# Patient Record
Sex: FEMALE | Race: OTHER | NOT HISPANIC OR LATINO | URBAN - METROPOLITAN AREA
[De-identification: names, ages, dates, MRNs, and addresses within clinical notes are randomized per-mention and may not be internally consistent; named-entity substitution may affect disease eponyms.]

---

## 2023-07-24 ENCOUNTER — EMERGENCY (EMERGENCY)
Facility: HOSPITAL | Age: 28
LOS: 1 days | Discharge: HOME | End: 2023-07-24
Attending: STUDENT IN AN ORGANIZED HEALTH CARE EDUCATION/TRAINING PROGRAM | Admitting: STUDENT IN AN ORGANIZED HEALTH CARE EDUCATION/TRAINING PROGRAM
Payer: COMMERCIAL

## 2023-07-24 VITALS
TEMPERATURE: 99 F | SYSTOLIC BLOOD PRESSURE: 152 MMHG | OXYGEN SATURATION: 99 % | WEIGHT: 184.09 LBS | DIASTOLIC BLOOD PRESSURE: 90 MMHG | RESPIRATION RATE: 18 BRPM | HEART RATE: 87 BPM

## 2023-07-24 VITALS
RESPIRATION RATE: 16 BRPM | OXYGEN SATURATION: 99 % | HEART RATE: 89 BPM | DIASTOLIC BLOOD PRESSURE: 90 MMHG | SYSTOLIC BLOOD PRESSURE: 140 MMHG | TEMPERATURE: 98 F

## 2023-07-24 DIAGNOSIS — O20.0 THREATENED ABORTION: ICD-10-CM

## 2023-07-24 DIAGNOSIS — Z3A.01 LESS THAN 8 WEEKS GESTATION OF PREGNANCY: ICD-10-CM

## 2023-07-24 DIAGNOSIS — O20.9 HEMORRHAGE IN EARLY PREGNANCY, UNSPECIFIED: ICD-10-CM

## 2023-07-24 DIAGNOSIS — O99.891 OTHER SPECIFIED DISEASES AND CONDITIONS COMPLICATING PREGNANCY: ICD-10-CM

## 2023-07-24 DIAGNOSIS — R42 DIZZINESS AND GIDDINESS: ICD-10-CM

## 2023-07-24 LAB
ALBUMIN SERPL ELPH-MCNC: 4.2 G/DL — SIGNIFICANT CHANGE UP (ref 3.3–5)
ALP SERPL-CCNC: 55 U/L — SIGNIFICANT CHANGE UP (ref 40–120)
ALT FLD-CCNC: 11 U/L — SIGNIFICANT CHANGE UP (ref 10–45)
ANION GAP SERPL CALC-SCNC: 15 MMOL/L — SIGNIFICANT CHANGE UP (ref 5–17)
APPEARANCE UR: CLEAR — SIGNIFICANT CHANGE UP
AST SERPL-CCNC: 13 U/L — SIGNIFICANT CHANGE UP (ref 10–40)
BACTERIA # UR AUTO: PRESENT /HPF
BASOPHILS # BLD AUTO: 0.02 K/UL — SIGNIFICANT CHANGE UP (ref 0–0.2)
BASOPHILS NFR BLD AUTO: 0.2 % — SIGNIFICANT CHANGE UP (ref 0–2)
BILIRUB SERPL-MCNC: 0.4 MG/DL — SIGNIFICANT CHANGE UP (ref 0.2–1.2)
BILIRUB UR-MCNC: NEGATIVE — SIGNIFICANT CHANGE UP
BLD GP AB SCN SERPL QL: NEGATIVE — SIGNIFICANT CHANGE UP
BUN SERPL-MCNC: 6 MG/DL — LOW (ref 7–23)
CALCIUM SERPL-MCNC: 9.2 MG/DL — SIGNIFICANT CHANGE UP (ref 8.4–10.5)
CHLORIDE SERPL-SCNC: 102 MMOL/L — SIGNIFICANT CHANGE UP (ref 96–108)
CO2 SERPL-SCNC: 20 MMOL/L — LOW (ref 22–31)
COLOR SPEC: YELLOW — SIGNIFICANT CHANGE UP
CREAT SERPL-MCNC: 0.51 MG/DL — SIGNIFICANT CHANGE UP (ref 0.5–1.3)
DIFF PNL FLD: ABNORMAL
EGFR: 132 ML/MIN/1.73M2 — SIGNIFICANT CHANGE UP
EOSINOPHIL # BLD AUTO: 0.04 K/UL — SIGNIFICANT CHANGE UP (ref 0–0.5)
EOSINOPHIL NFR BLD AUTO: 0.5 % — SIGNIFICANT CHANGE UP (ref 0–6)
EPI CELLS # UR: ABNORMAL /HPF (ref 0–5)
GLUCOSE SERPL-MCNC: 81 MG/DL — SIGNIFICANT CHANGE UP (ref 70–99)
GLUCOSE UR QL: NEGATIVE — SIGNIFICANT CHANGE UP
HCG SERPL-ACNC: HIGH MIU/ML
HCT VFR BLD CALC: 39.4 % — SIGNIFICANT CHANGE UP (ref 34.5–45)
HGB BLD-MCNC: 12.9 G/DL — SIGNIFICANT CHANGE UP (ref 11.5–15.5)
IMM GRANULOCYTES NFR BLD AUTO: 0.3 % — SIGNIFICANT CHANGE UP (ref 0–0.9)
KETONES UR-MCNC: NEGATIVE — SIGNIFICANT CHANGE UP
LEUKOCYTE ESTERASE UR-ACNC: NEGATIVE — SIGNIFICANT CHANGE UP
LYMPHOCYTES # BLD AUTO: 2.4 K/UL — SIGNIFICANT CHANGE UP (ref 1–3.3)
LYMPHOCYTES # BLD AUTO: 27.6 % — SIGNIFICANT CHANGE UP (ref 13–44)
MCHC RBC-ENTMCNC: 28.5 PG — SIGNIFICANT CHANGE UP (ref 27–34)
MCHC RBC-ENTMCNC: 32.7 GM/DL — SIGNIFICANT CHANGE UP (ref 32–36)
MCV RBC AUTO: 87 FL — SIGNIFICANT CHANGE UP (ref 80–100)
MONOCYTES # BLD AUTO: 0.54 K/UL — SIGNIFICANT CHANGE UP (ref 0–0.9)
MONOCYTES NFR BLD AUTO: 6.2 % — SIGNIFICANT CHANGE UP (ref 2–14)
NEUTROPHILS # BLD AUTO: 5.68 K/UL — SIGNIFICANT CHANGE UP (ref 1.8–7.4)
NEUTROPHILS NFR BLD AUTO: 65.2 % — SIGNIFICANT CHANGE UP (ref 43–77)
NITRITE UR-MCNC: POSITIVE
NRBC # BLD: 0 /100 WBCS — SIGNIFICANT CHANGE UP (ref 0–0)
PH UR: 7 — SIGNIFICANT CHANGE UP (ref 5–8)
PLATELET # BLD AUTO: 248 K/UL — SIGNIFICANT CHANGE UP (ref 150–400)
POTASSIUM SERPL-MCNC: 3.7 MMOL/L — SIGNIFICANT CHANGE UP (ref 3.5–5.3)
POTASSIUM SERPL-SCNC: 3.7 MMOL/L — SIGNIFICANT CHANGE UP (ref 3.5–5.3)
PROT SERPL-MCNC: 7.9 G/DL — SIGNIFICANT CHANGE UP (ref 6–8.3)
PROT UR-MCNC: NEGATIVE MG/DL — SIGNIFICANT CHANGE UP
RBC # BLD: 4.53 M/UL — SIGNIFICANT CHANGE UP (ref 3.8–5.2)
RBC # FLD: 15.1 % — HIGH (ref 10.3–14.5)
RBC CASTS # UR COMP ASSIST: < 5 /HPF — SIGNIFICANT CHANGE UP
RH IG SCN BLD-IMP: POSITIVE — SIGNIFICANT CHANGE UP
SODIUM SERPL-SCNC: 137 MMOL/L — SIGNIFICANT CHANGE UP (ref 135–145)
SP GR SPEC: 1.01 — SIGNIFICANT CHANGE UP (ref 1–1.03)
UROBILINOGEN FLD QL: 0.2 E.U./DL — SIGNIFICANT CHANGE UP
WBC # BLD: 8.71 K/UL — SIGNIFICANT CHANGE UP (ref 3.8–10.5)
WBC # FLD AUTO: 8.71 K/UL — SIGNIFICANT CHANGE UP (ref 3.8–10.5)
WBC UR QL: < 5 /HPF — SIGNIFICANT CHANGE UP

## 2023-07-24 PROCEDURE — 76817 TRANSVAGINAL US OBSTETRIC: CPT

## 2023-07-24 PROCEDURE — 36415 COLL VENOUS BLD VENIPUNCTURE: CPT

## 2023-07-24 PROCEDURE — 84702 CHORIONIC GONADOTROPIN TEST: CPT

## 2023-07-24 PROCEDURE — 99284 EMERGENCY DEPT VISIT MOD MDM: CPT | Mod: 25

## 2023-07-24 PROCEDURE — 81001 URINALYSIS AUTO W/SCOPE: CPT

## 2023-07-24 PROCEDURE — 76801 OB US < 14 WKS SINGLE FETUS: CPT

## 2023-07-24 PROCEDURE — 85025 COMPLETE CBC W/AUTO DIFF WBC: CPT

## 2023-07-24 PROCEDURE — 86900 BLOOD TYPING SEROLOGIC ABO: CPT

## 2023-07-24 PROCEDURE — 86901 BLOOD TYPING SEROLOGIC RH(D): CPT

## 2023-07-24 PROCEDURE — 80053 COMPREHEN METABOLIC PANEL: CPT

## 2023-07-24 PROCEDURE — 86850 RBC ANTIBODY SCREEN: CPT

## 2023-07-24 PROCEDURE — 76817 TRANSVAGINAL US OBSTETRIC: CPT | Mod: 26

## 2023-07-24 PROCEDURE — 76801 OB US < 14 WKS SINGLE FETUS: CPT | Mod: 26

## 2023-07-24 PROCEDURE — 99284 EMERGENCY DEPT VISIT MOD MDM: CPT

## 2023-07-24 RX ORDER — NITROFURANTOIN MACROCRYSTAL 50 MG
1 CAPSULE ORAL
Qty: 13 | Refills: 0
Start: 2023-07-24 | End: 2023-07-30

## 2023-07-24 RX ORDER — ACETAMINOPHEN 500 MG
975 TABLET ORAL ONCE
Refills: 0 | Status: COMPLETED | OUTPATIENT
Start: 2023-07-24 | End: 2023-07-24

## 2023-07-24 RX ORDER — NITROFURANTOIN MACROCRYSTAL 50 MG
100 CAPSULE ORAL ONCE
Refills: 0 | Status: COMPLETED | OUTPATIENT
Start: 2023-07-24 | End: 2023-07-24

## 2023-07-24 RX ADMIN — Medication 100 MILLIGRAM(S): at 14:54

## 2023-07-24 RX ADMIN — Medication 975 MILLIGRAM(S): at 13:13

## 2023-07-24 NOTE — ED PROVIDER NOTE - ATTENDING APP SHARED VISIT CONTRIBUTION OF CARE
closed cervical os with vaginal spotting. IUP on TVUS. Discharged home with strict return precautions and follow up.

## 2023-07-24 NOTE — ED ADULT NURSE NOTE - OBJECTIVE STATEMENT
Pt A&Ox4 and able to speak in complete sentences. pt breathing even and unlabored with equal chest rise and fall. Pt has positive pregnancy test reported. Pt endorsed she was concerned d/t spotting. pt hx of previous terminated pregnancy. pt lmp 5/31. pt denies lightheadedness, dizziness, cp, sob, fevers, chills, malodorous discharge, back pain. pt has occasional cramps reported.

## 2023-07-24 NOTE — ED ADULT NURSE NOTE - NS ED NOTE ABUSE SUSPICION NEGLECT YN
"Chief Complaint   Patient presents with     Post Partum Exam      21--pap due--placental abruption and deliver--pt is pumping and now weening off--would like birth control pills       Initial /64 (BP Location: Right arm, Patient Position: Sitting, Cuff Size: Adult Regular)   Ht 1.651 m (5' 5\")   Wt 70.1 kg (154 lb 9.6 oz)   LMP 10/26/2018 (Exact Date)   BMI 25.73 kg/m   Estimated body mass index is 25.73 kg/m  as calculated from the following:    Height as of this encounter: 1.651 m (5' 5\").    Weight as of this encounter: 70.1 kg (154 lb 9.6 oz).  BP completed using cuff size: regular    Questioned patient about current smoking habits.  Pt. has never smoked.          The following HM Due: Pap Smear      "
No

## 2023-07-24 NOTE — ED ADULT TRIAGE NOTE - CHIEF COMPLAINT QUOTE
pt states she is 7 weeks pregnant and is c/o vaginal spotting and cramping since yesterday. LMP: 5/31

## 2023-07-24 NOTE — ED PROVIDER NOTE - CLINICAL SUMMARY MEDICAL DECISION MAKING FREE TEXT BOX
27 yo  @ 7w5d c/o vaginal bleeding yesterday afternoon. Describes spotting, mostly with wiping. Pts first OB visit is scheduled for . Associated with pelvic cramping worse on the left side. Reports some associated lightheadedness this morning. Denies fever, chills, n/v/d, dysuria. Abd soft, nt, nd. Scant blood in vault, no active bleeding, no cmt or adnexal tenderness. 27 yo  @ 7w5d c/o vaginal bleeding yesterday afternoon. Describes spotting, mostly with wiping. Pts first OB visit is scheduled for . Associated with pelvic cramping worse on the left side. Reports some associated lightheadedness this morning. Denies fever, chills, n/v/d, dysuria. Abd soft, nt, nd. Scant blood in vault, no active bleeding, no cmt or adnexal tenderness. r/o ectopic

## 2023-07-24 NOTE — ED PROVIDER NOTE - OBJECTIVE STATEMENT
27 yo  @ 7w5d c/o vaginal bleeding yesterday afternoon. Describes spotting, mostly with wiping. Pts first OB visit is scheduled for . Associated with pelvic cramping worse on the left side. Reports some associated lightheadedness this morning. Denies fever, chills, n/v/d, dysuria.

## 2023-07-24 NOTE — ED PROVIDER NOTE - NSFOLLOWUPINSTRUCTIONS_ED_ALL_ED_FT
Threatened Miscarriage    A threatened miscarriage is the term for vaginal bleeding during your first 20 weeks of pregnancy but the pregnancy has not ended. If you have vaginal bleeding during this time, your health care provider will do tests to check if you are still pregnant. If the tests show you are still pregnant and the developing baby (fetus) inside your womb (uterus) is still growing, your condition is considered a threatened miscarriage. A threatened miscarriage does not mean your pregnancy will end, but it does increase the risk of losing your pregnancy. It is important to have close follow up with your obstetrician.    SEEK IMMEDIATE MEDICAL CARE IF YOU HAVE ANY OF THE FOLLOWING SYMPTOMS: heavy vaginal bleeding, severe low back or abdominal cramps, fever/chills, or lightheadedness/dizziness.    Pelvic Rest    WHAT YOU NEED TO KNOW:    Pelvic rest is when you do not put anything in your vagina. This includes tampons, douching, or sexual activity. You may need to avoid lifting heavy objects. Your healthcare provider will tell you how long you need pelvic rest.    DISCHARGE INSTRUCTIONS:    Return to the emergency department if:     You have sudden, heavy bleeding from your vagina.      You have severe pain.    Contact your healthcare provider if:     You have discharge from your vagina that smells bad.       You have a fever.      You have questions or concerns about your condition or care.    Follow up with your healthcare provider as directed: Write down your questions so you remember to ask them during your visits.    Subchorionic Hematoma    A hematoma is a collection of blood outside of the blood vessels. A subchorionic hematoma is a collection of blood between the outer wall of the embryo (chorion) and the inner wall of the uterus.    This condition can cause vaginal bleeding. Early small hematomas usually shrink on their own and do not affect your baby or pregnancy. When bleeding starts later in pregnancy, or if the hematoma is larger or occurs in older pregnant women, the condition may be more serious. Larger hematomas increase the chances of miscarriage. This condition also increases the risk of:  Premature separation of the placenta from the uterus.  Premature () labor.  Stillbirth.  What are the causes?  The exact cause of this condition is not known. It occurs when blood is trapped between the placenta and the uterine wall because the placenta has  from the original site of implantation.    What increases the risk?  You are more likely to develop this condition if:  You were treated with fertility medicines.  You became pregnant through in vitro fertilization (IVF).  What are the signs or symptoms?  Symptoms of this condition include:  Vaginal spotting or bleeding.  Abdominal pain. This is rare.  Sometimes you may have no symptoms and the bleeding may only be seen when ultrasound images are taken (transvaginal ultrasound).    How is this diagnosed?  This condition is diagnosed based on a physical exam. This includes a pelvic exam. You may also have other tests, including:  Blood tests.  Urine tests.  Ultrasound of the abdomen.  How is this treated?  Treatment for this condition can vary. Treatment may include:  Watchful waiting. You will be monitored closely for any changes in bleeding.  Medicines.  Activity restriction. This may be needed until the bleeding stops.  A medicine called Rh immunoglobulin. This is given if you have an Rh-negative blood type. It prevents Rh sensitization.  Follow these instructions at home:  Stay on bed rest if told to do so by your health care provider.  Do not lift anything that is heavier than 10 lb (4.5 kg), or the limit that you are told by your health care provider.  Track and write down the number of pads you use each day and how soaked (saturated) they are.  Do not use tampons.  Keep all follow-up visits. This is important. Your health care provider may ask you to have follow-up blood tests or ultrasound tests or both.  Contact a health care provider if:  You have any vaginal bleeding.  You have a fever.  Get help right away if:  You have severe cramps in your stomach, back, abdomen, or pelvis.  You pass large clots or tissue. Save any tissue for your health care provider to look at.  You faint.  You become light-headed or weak.  Summary  A subchorionic hematoma is a collection of blood between the outer wall of the embryo (chorion) and the inner wall of the uterus.  This condition can cause vaginal bleeding.  Sometimes you may have no symptoms and the bleeding may only be seen when ultrasound images are taken.  Treatment may include watchful waiting, medicines, or activity restriction.  Keep all follow-up visits. Get help right away if you have severe cramps or heavy vaginal bleeding.  This information is not intended to replace advice given to you by your health care provider. Make sure you discuss any questions you have with your health care provider.

## 2023-07-24 NOTE — ED PROVIDER NOTE - PHYSICAL EXAMINATION
CONSTITUTIONAL: Well-appearing;  in no apparent distress.   HEAD: Normocephalic; atraumatic.   EYES: PERRL; EOM intact; conjunctiva and sclera clear  ENT: normal nose; no rhinorrhea; normal pharynx with no erythema or lesions.   NECK: Supple; non-tender; no LAD  CARDIOVASCULAR: Normal S1, S2; No audible murmurs. Regular rate and rhythm.   RESPIRATORY: Breathing easily; breath sounds clear and equal bilaterally; no wheezes, rhonchi, or rales.  GI: Soft; non-distended; non-tender; no palpable organomegaly.   : pelvic- scant clotted blood in vault, no cmt or adnexal tenderness   MSK: FROM at all extremities, normal tone   EXT: No cyanosis or edema; N/V intact  SKIN: Normal for age and race; warm; dry; good turgor; no apparent lesions or rash.   NEURO: A & O x 3; face symmetric; grossly unremarkable.   PSYCHOLOGICAL: The patient’s mood and manner are appropriate.

## 2023-07-24 NOTE — ED PROVIDER NOTE - PATIENT PORTAL LINK FT
You can access the FollowMyHealth Patient Portal offered by [facility] by registering at the following website: [email or web address]. By joining [employer]’s FollowMyHealth portal, you will also be able to view your health information using other applications (apps) compatible with our system.

## 2023-07-24 NOTE — ED ADULT NURSE NOTE - NSFALLUNIVINTERV_ED_ALL_ED
Bed/Stretcher in lowest position, wheels locked, appropriate side rails in place/Call bell, personal items and telephone in reach/Instruct patient to call for assistance before getting out of bed/chair/stretcher/Non-slip footwear applied when patient is off stretcher/[place] to call system/Physically safe environment - no spills, clutter or unnecessary equipment/Purposeful proactive rounding/Room/bathroom lighting operational, light cord in reach

## 2023-08-21 ENCOUNTER — NON-APPOINTMENT (OUTPATIENT)
Age: 28
End: 2023-08-21

## 2023-08-22 ENCOUNTER — ASOB RESULT (OUTPATIENT)
Age: 28
End: 2023-08-22

## 2023-08-22 ENCOUNTER — APPOINTMENT (OUTPATIENT)
Dept: ANTEPARTUM | Facility: CLINIC | Age: 28
End: 2023-08-22
Payer: MEDICAID

## 2023-08-22 PROCEDURE — 76813 OB US NUCHAL MEAS 1 GEST: CPT

## 2023-08-22 PROCEDURE — 93976 VASCULAR STUDY: CPT

## 2023-11-01 ENCOUNTER — APPOINTMENT (OUTPATIENT)
Dept: ANTEPARTUM | Facility: CLINIC | Age: 28
End: 2023-11-01
Payer: MEDICAID

## 2023-11-01 ENCOUNTER — ASOB RESULT (OUTPATIENT)
Age: 28
End: 2023-11-01

## 2023-11-01 PROCEDURE — 76811 OB US DETAILED SNGL FETUS: CPT

## 2023-11-01 PROCEDURE — 76817 TRANSVAGINAL US OBSTETRIC: CPT

## 2023-11-01 PROCEDURE — 76819 FETAL BIOPHYS PROFIL W/O NST: CPT

## 2023-11-01 PROCEDURE — 76820 UMBILICAL ARTERY ECHO: CPT | Mod: 59

## 2023-11-10 ENCOUNTER — ASOB RESULT (OUTPATIENT)
Age: 28
End: 2023-11-10

## 2023-11-10 ENCOUNTER — APPOINTMENT (OUTPATIENT)
Dept: ANTEPARTUM | Facility: CLINIC | Age: 28
End: 2023-11-10
Payer: MEDICAID

## 2023-11-10 PROCEDURE — 76816 OB US FOLLOW-UP PER FETUS: CPT

## 2023-11-23 ENCOUNTER — INPATIENT (INPATIENT)
Facility: HOSPITAL | Age: 28
LOS: 0 days | Discharge: HOME | End: 2023-11-24
Attending: OBSTETRICS & GYNECOLOGY | Admitting: OBSTETRICS & GYNECOLOGY
Payer: MEDICAID

## 2023-11-23 VITALS — WEIGHT: 195.11 LBS | HEIGHT: 60 IN

## 2023-11-23 DIAGNOSIS — O26.899 OTHER SPECIFIED PREGNANCY RELATED CONDITIONS, UNSPECIFIED TRIMESTER: ICD-10-CM

## 2023-11-23 LAB
ALBUMIN SERPL ELPH-MCNC: 3.8 G/DL — SIGNIFICANT CHANGE UP (ref 3.3–5)
ALBUMIN SERPL ELPH-MCNC: 3.8 G/DL — SIGNIFICANT CHANGE UP (ref 3.3–5)
ALP SERPL-CCNC: 67 U/L — SIGNIFICANT CHANGE UP (ref 40–120)
ALP SERPL-CCNC: 67 U/L — SIGNIFICANT CHANGE UP (ref 40–120)
ALT FLD-CCNC: 14 U/L — SIGNIFICANT CHANGE UP (ref 10–45)
ALT FLD-CCNC: 14 U/L — SIGNIFICANT CHANGE UP (ref 10–45)
ANION GAP SERPL CALC-SCNC: 13 MMOL/L — SIGNIFICANT CHANGE UP (ref 5–17)
ANION GAP SERPL CALC-SCNC: 13 MMOL/L — SIGNIFICANT CHANGE UP (ref 5–17)
APTT BLD: 29.4 SEC — SIGNIFICANT CHANGE UP (ref 24.5–35.6)
APTT BLD: 29.4 SEC — SIGNIFICANT CHANGE UP (ref 24.5–35.6)
APTT BLD: 29.6 SEC — SIGNIFICANT CHANGE UP (ref 24.5–35.6)
APTT BLD: 29.6 SEC — SIGNIFICANT CHANGE UP (ref 24.5–35.6)
AST SERPL-CCNC: 16 U/L — SIGNIFICANT CHANGE UP (ref 10–40)
AST SERPL-CCNC: 16 U/L — SIGNIFICANT CHANGE UP (ref 10–40)
BASOPHILS # BLD AUTO: 0.01 K/UL — SIGNIFICANT CHANGE UP (ref 0–0.2)
BASOPHILS # BLD AUTO: 0.01 K/UL — SIGNIFICANT CHANGE UP (ref 0–0.2)
BASOPHILS NFR BLD AUTO: 0.1 % — SIGNIFICANT CHANGE UP (ref 0–2)
BASOPHILS NFR BLD AUTO: 0.1 % — SIGNIFICANT CHANGE UP (ref 0–2)
BILIRUB SERPL-MCNC: 0.3 MG/DL — SIGNIFICANT CHANGE UP (ref 0.2–1.2)
BILIRUB SERPL-MCNC: 0.3 MG/DL — SIGNIFICANT CHANGE UP (ref 0.2–1.2)
BLD GP AB SCN SERPL QL: NEGATIVE — SIGNIFICANT CHANGE UP
BLD GP AB SCN SERPL QL: NEGATIVE — SIGNIFICANT CHANGE UP
BUN SERPL-MCNC: 4 MG/DL — LOW (ref 7–23)
BUN SERPL-MCNC: 4 MG/DL — LOW (ref 7–23)
CALCIUM SERPL-MCNC: 9.9 MG/DL — SIGNIFICANT CHANGE UP (ref 8.4–10.5)
CALCIUM SERPL-MCNC: 9.9 MG/DL — SIGNIFICANT CHANGE UP (ref 8.4–10.5)
CHLORIDE SERPL-SCNC: 105 MMOL/L — SIGNIFICANT CHANGE UP (ref 96–108)
CHLORIDE SERPL-SCNC: 105 MMOL/L — SIGNIFICANT CHANGE UP (ref 96–108)
CO2 SERPL-SCNC: 23 MMOL/L — SIGNIFICANT CHANGE UP (ref 22–31)
CO2 SERPL-SCNC: 23 MMOL/L — SIGNIFICANT CHANGE UP (ref 22–31)
CREAT ?TM UR-MCNC: 70 MG/DL — SIGNIFICANT CHANGE UP
CREAT ?TM UR-MCNC: 70 MG/DL — SIGNIFICANT CHANGE UP
CREAT SERPL-MCNC: 0.43 MG/DL — LOW (ref 0.5–1.3)
CREAT SERPL-MCNC: 0.43 MG/DL — LOW (ref 0.5–1.3)
EGFR: 137 ML/MIN/1.73M2 — SIGNIFICANT CHANGE UP
EGFR: 137 ML/MIN/1.73M2 — SIGNIFICANT CHANGE UP
EOSINOPHIL # BLD AUTO: 0.03 K/UL — SIGNIFICANT CHANGE UP (ref 0–0.5)
EOSINOPHIL # BLD AUTO: 0.03 K/UL — SIGNIFICANT CHANGE UP (ref 0–0.5)
EOSINOPHIL NFR BLD AUTO: 0.4 % — SIGNIFICANT CHANGE UP (ref 0–6)
EOSINOPHIL NFR BLD AUTO: 0.4 % — SIGNIFICANT CHANGE UP (ref 0–6)
FIBRINOGEN PPP-MCNC: 519 MG/DL — HIGH (ref 200–445)
FIBRINOGEN PPP-MCNC: 519 MG/DL — HIGH (ref 200–445)
GLUCOSE SERPL-MCNC: 109 MG/DL — HIGH (ref 70–99)
GLUCOSE SERPL-MCNC: 109 MG/DL — HIGH (ref 70–99)
HCT VFR BLD CALC: 38.5 % — SIGNIFICANT CHANGE UP (ref 34.5–45)
HCT VFR BLD CALC: 38.5 % — SIGNIFICANT CHANGE UP (ref 34.5–45)
HGB BLD-MCNC: 12.6 G/DL — SIGNIFICANT CHANGE UP (ref 11.5–15.5)
HGB BLD-MCNC: 12.6 G/DL — SIGNIFICANT CHANGE UP (ref 11.5–15.5)
IMM GRANULOCYTES NFR BLD AUTO: 0.5 % — SIGNIFICANT CHANGE UP (ref 0–0.9)
IMM GRANULOCYTES NFR BLD AUTO: 0.5 % — SIGNIFICANT CHANGE UP (ref 0–0.9)
INR BLD: 1 — SIGNIFICANT CHANGE UP (ref 0.85–1.18)
INR BLD: 1 — SIGNIFICANT CHANGE UP (ref 0.85–1.18)
KLEIHAUER-BETKE CALCULATION: 0.1 % — SIGNIFICANT CHANGE UP (ref 0–0.3)
KLEIHAUER-BETKE CALCULATION: 0.1 % — SIGNIFICANT CHANGE UP (ref 0–0.3)
LDH SERPL L TO P-CCNC: 290 U/L — HIGH (ref 50–242)
LDH SERPL L TO P-CCNC: 290 U/L — HIGH (ref 50–242)
LYMPHOCYTES # BLD AUTO: 1.45 K/UL — SIGNIFICANT CHANGE UP (ref 1–3.3)
LYMPHOCYTES # BLD AUTO: 1.45 K/UL — SIGNIFICANT CHANGE UP (ref 1–3.3)
LYMPHOCYTES # BLD AUTO: 19.6 % — SIGNIFICANT CHANGE UP (ref 13–44)
LYMPHOCYTES # BLD AUTO: 19.6 % — SIGNIFICANT CHANGE UP (ref 13–44)
MCHC RBC-ENTMCNC: 29.1 PG — SIGNIFICANT CHANGE UP (ref 27–34)
MCHC RBC-ENTMCNC: 29.1 PG — SIGNIFICANT CHANGE UP (ref 27–34)
MCHC RBC-ENTMCNC: 32.7 GM/DL — SIGNIFICANT CHANGE UP (ref 32–36)
MCHC RBC-ENTMCNC: 32.7 GM/DL — SIGNIFICANT CHANGE UP (ref 32–36)
MCV RBC AUTO: 88.9 FL — SIGNIFICANT CHANGE UP (ref 80–100)
MCV RBC AUTO: 88.9 FL — SIGNIFICANT CHANGE UP (ref 80–100)
MONOCYTES # BLD AUTO: 0.31 K/UL — SIGNIFICANT CHANGE UP (ref 0–0.9)
MONOCYTES # BLD AUTO: 0.31 K/UL — SIGNIFICANT CHANGE UP (ref 0–0.9)
MONOCYTES NFR BLD AUTO: 4.2 % — SIGNIFICANT CHANGE UP (ref 2–14)
MONOCYTES NFR BLD AUTO: 4.2 % — SIGNIFICANT CHANGE UP (ref 2–14)
NEUTROPHILS # BLD AUTO: 5.54 K/UL — SIGNIFICANT CHANGE UP (ref 1.8–7.4)
NEUTROPHILS # BLD AUTO: 5.54 K/UL — SIGNIFICANT CHANGE UP (ref 1.8–7.4)
NEUTROPHILS NFR BLD AUTO: 75.2 % — SIGNIFICANT CHANGE UP (ref 43–77)
NEUTROPHILS NFR BLD AUTO: 75.2 % — SIGNIFICANT CHANGE UP (ref 43–77)
NRBC # BLD: 0 /100 WBCS — SIGNIFICANT CHANGE UP (ref 0–0)
NRBC # BLD: 0 /100 WBCS — SIGNIFICANT CHANGE UP (ref 0–0)
PLATELET # BLD AUTO: 198 K/UL — SIGNIFICANT CHANGE UP (ref 150–400)
PLATELET # BLD AUTO: 198 K/UL — SIGNIFICANT CHANGE UP (ref 150–400)
POTASSIUM SERPL-MCNC: 3.7 MMOL/L — SIGNIFICANT CHANGE UP (ref 3.5–5.3)
POTASSIUM SERPL-MCNC: 3.7 MMOL/L — SIGNIFICANT CHANGE UP (ref 3.5–5.3)
POTASSIUM SERPL-SCNC: 3.7 MMOL/L — SIGNIFICANT CHANGE UP (ref 3.5–5.3)
POTASSIUM SERPL-SCNC: 3.7 MMOL/L — SIGNIFICANT CHANGE UP (ref 3.5–5.3)
PROT ?TM UR-MCNC: 10 MG/DL — SIGNIFICANT CHANGE UP (ref 0–12)
PROT ?TM UR-MCNC: 10 MG/DL — SIGNIFICANT CHANGE UP (ref 0–12)
PROT SERPL-MCNC: 7.4 G/DL — SIGNIFICANT CHANGE UP (ref 6–8.3)
PROT SERPL-MCNC: 7.4 G/DL — SIGNIFICANT CHANGE UP (ref 6–8.3)
PROT/CREAT UR-RTO: 0.1 RATIO — SIGNIFICANT CHANGE UP (ref 0–0.2)
PROT/CREAT UR-RTO: 0.1 RATIO — SIGNIFICANT CHANGE UP (ref 0–0.2)
PROTHROM AB SERPL-ACNC: 11.4 SEC — SIGNIFICANT CHANGE UP (ref 9.5–13)
PROTHROM AB SERPL-ACNC: 11.4 SEC — SIGNIFICANT CHANGE UP (ref 9.5–13)
RBC # BLD: 4.33 M/UL — SIGNIFICANT CHANGE UP (ref 3.8–5.2)
RBC # BLD: 4.33 M/UL — SIGNIFICANT CHANGE UP (ref 3.8–5.2)
RBC # FLD: 14.2 % — SIGNIFICANT CHANGE UP (ref 10.3–14.5)
RBC # FLD: 14.2 % — SIGNIFICANT CHANGE UP (ref 10.3–14.5)
RH IG SCN BLD-IMP: POSITIVE — SIGNIFICANT CHANGE UP
RH IG SCN BLD-IMP: POSITIVE — SIGNIFICANT CHANGE UP
SODIUM SERPL-SCNC: 141 MMOL/L — SIGNIFICANT CHANGE UP (ref 135–145)
SODIUM SERPL-SCNC: 141 MMOL/L — SIGNIFICANT CHANGE UP (ref 135–145)
URATE SERPL-MCNC: 5.1 MG/DL — SIGNIFICANT CHANGE UP (ref 2.5–7)
URATE SERPL-MCNC: 5.1 MG/DL — SIGNIFICANT CHANGE UP (ref 2.5–7)
WBC # BLD: 7.38 K/UL — SIGNIFICANT CHANGE UP (ref 3.8–10.5)
WBC # BLD: 7.38 K/UL — SIGNIFICANT CHANGE UP (ref 3.8–10.5)
WBC # FLD AUTO: 7.38 K/UL — SIGNIFICANT CHANGE UP (ref 3.8–10.5)
WBC # FLD AUTO: 7.38 K/UL — SIGNIFICANT CHANGE UP (ref 3.8–10.5)

## 2023-11-23 PROCEDURE — 88291 CYTO/MOLECULAR REPORT: CPT

## 2023-11-23 RX ORDER — FENTANYL/BUPIVACAINE/NS/PF 2MCG/ML-.1
250 PLASTIC BAG, INJECTION (ML) INJECTION
Refills: 0 | Status: DISCONTINUED | OUTPATIENT
Start: 2023-11-23 | End: 2023-11-24

## 2023-11-23 RX ORDER — CITRIC ACID/SODIUM CITRATE 300-500 MG
15 SOLUTION, ORAL ORAL EVERY 6 HOURS
Refills: 0 | Status: DISCONTINUED | OUTPATIENT
Start: 2023-11-23 | End: 2023-11-24

## 2023-11-23 RX ORDER — OXYTOCIN 10 UNIT/ML
333.33 VIAL (ML) INJECTION
Qty: 20 | Refills: 0 | Status: DISCONTINUED | OUTPATIENT
Start: 2023-11-23 | End: 2023-11-24

## 2023-11-23 RX ORDER — INFLUENZA VIRUS VACCINE 15; 15; 15; 15 UG/.5ML; UG/.5ML; UG/.5ML; UG/.5ML
0.5 SUSPENSION INTRAMUSCULAR ONCE
Refills: 0 | Status: DISCONTINUED | OUTPATIENT
Start: 2023-11-23 | End: 2023-11-24

## 2023-11-23 RX ORDER — SODIUM CHLORIDE 9 MG/ML
1000 INJECTION, SOLUTION INTRAVENOUS
Refills: 0 | Status: DISCONTINUED | OUTPATIENT
Start: 2023-11-23 | End: 2023-11-24

## 2023-11-23 RX ORDER — CHLORHEXIDINE GLUCONATE 213 G/1000ML
1 SOLUTION TOPICAL DAILY
Refills: 0 | Status: DISCONTINUED | OUTPATIENT
Start: 2023-11-23 | End: 2023-11-24

## 2023-11-23 RX ORDER — ALBUTEROL 90 UG/1
2 AEROSOL, METERED ORAL EVERY 6 HOURS
Refills: 0 | Status: DISCONTINUED | OUTPATIENT
Start: 2023-11-23 | End: 2023-11-24

## 2023-11-23 RX ADMIN — SODIUM CHLORIDE 125 MILLILITER(S): 9 INJECTION, SOLUTION INTRAVENOUS at 12:25

## 2023-11-23 NOTE — PATIENT PROFILE OB - FUNCTIONAL ASSESSMENT - BASIC MOBILITY SCORE HIDDEN
Writer updated pt though my chart message, will give pt a call if she doesn't see message soon.    24

## 2023-11-23 NOTE — PATIENT PROFILE OB - AS DELIV COMPLICATIONS OB
Dr Fonseca Friend referred patient to see Dr Laurence De León for mediastinal adenopathy, the office called today to let us know they spoke with the patient and it is ok for us to call to sched an appt  I called patient today @ 11:45am and LM on his VM to call us for an appt 
none

## 2023-11-23 NOTE — PRE-ANESTHESIA EVALUATION ADULT - NSANTHOSAYNRD_GEN_A_CORE
No. NIKA screening performed.  STOP BANG Legend: 0-2 = LOW Risk; 3-4 = INTERMEDIATE Risk; 5-8 = HIGH Risk

## 2023-11-24 ENCOUNTER — TRANSCRIPTION ENCOUNTER (OUTPATIENT)
Age: 28
End: 2023-11-24

## 2023-11-24 VITALS
RESPIRATION RATE: 18 BRPM | SYSTOLIC BLOOD PRESSURE: 125 MMHG | DIASTOLIC BLOOD PRESSURE: 63 MMHG | TEMPERATURE: 98 F | OXYGEN SATURATION: 99 % | HEART RATE: 86 BPM

## 2023-11-24 LAB
T PALLIDUM AB TITR SER: NEGATIVE — SIGNIFICANT CHANGE UP
T PALLIDUM AB TITR SER: NEGATIVE — SIGNIFICANT CHANGE UP

## 2023-11-24 PROCEDURE — 82570 ASSAY OF URINE CREATININE: CPT

## 2023-11-24 PROCEDURE — 59050 FETAL MONITOR W/REPORT: CPT

## 2023-11-24 PROCEDURE — 85025 COMPLETE CBC W/AUTO DIFF WBC: CPT

## 2023-11-24 PROCEDURE — 86146 BETA-2 GLYCOPROTEIN ANTIBODY: CPT

## 2023-11-24 PROCEDURE — 83615 LACTATE (LD) (LDH) ENZYME: CPT

## 2023-11-24 PROCEDURE — 86901 BLOOD TYPING SEROLOGIC RH(D): CPT

## 2023-11-24 PROCEDURE — 80053 COMPREHEN METABOLIC PANEL: CPT

## 2023-11-24 PROCEDURE — 85598 HEXAGNAL PHOSPH PLTLT NEUTRL: CPT

## 2023-11-24 PROCEDURE — 36415 COLL VENOUS BLD VENIPUNCTURE: CPT

## 2023-11-24 PROCEDURE — 85613 RUSSELL VIPER VENOM DILUTED: CPT

## 2023-11-24 PROCEDURE — 86780 TREPONEMA PALLIDUM: CPT

## 2023-11-24 PROCEDURE — 84156 ASSAY OF PROTEIN URINE: CPT

## 2023-11-24 PROCEDURE — 86850 RBC ANTIBODY SCREEN: CPT

## 2023-11-24 PROCEDURE — 81229 CYTOG ALYS CHRML ABNR SNPCGH: CPT

## 2023-11-24 PROCEDURE — 85730 THROMBOPLASTIN TIME PARTIAL: CPT

## 2023-11-24 PROCEDURE — 85610 PROTHROMBIN TIME: CPT

## 2023-11-24 PROCEDURE — 85460 HEMOGLOBIN FETAL: CPT

## 2023-11-24 PROCEDURE — 86900 BLOOD TYPING SEROLOGIC ABO: CPT

## 2023-11-24 PROCEDURE — 88307 TISSUE EXAM BY PATHOLOGIST: CPT | Mod: 26

## 2023-11-24 PROCEDURE — 85384 FIBRINOGEN ACTIVITY: CPT

## 2023-11-24 PROCEDURE — 88307 TISSUE EXAM BY PATHOLOGIST: CPT

## 2023-11-24 PROCEDURE — 86147 CARDIOLIPIN ANTIBODY EA IG: CPT

## 2023-11-24 PROCEDURE — 84550 ASSAY OF BLOOD/URIC ACID: CPT

## 2023-11-24 RX ORDER — OXYCODONE HYDROCHLORIDE 5 MG/1
5 TABLET ORAL
Refills: 0 | Status: DISCONTINUED | OUTPATIENT
Start: 2023-11-24 | End: 2023-11-24

## 2023-11-24 RX ORDER — MAGNESIUM HYDROXIDE 400 MG/1
30 TABLET, CHEWABLE ORAL
Refills: 0 | Status: DISCONTINUED | OUTPATIENT
Start: 2023-11-24 | End: 2023-11-24

## 2023-11-24 RX ORDER — TETANUS TOXOID, REDUCED DIPHTHERIA TOXOID AND ACELLULAR PERTUSSIS VACCINE, ADSORBED 5; 2.5; 8; 8; 2.5 [IU]/.5ML; [IU]/.5ML; UG/.5ML; UG/.5ML; UG/.5ML
0.5 SUSPENSION INTRAMUSCULAR ONCE
Refills: 0 | Status: DISCONTINUED | OUTPATIENT
Start: 2023-11-24 | End: 2023-11-24

## 2023-11-24 RX ORDER — SIMETHICONE 80 MG/1
80 TABLET, CHEWABLE ORAL EVERY 4 HOURS
Refills: 0 | Status: DISCONTINUED | OUTPATIENT
Start: 2023-11-24 | End: 2023-11-24

## 2023-11-24 RX ORDER — PRAMOXINE HYDROCHLORIDE 150 MG/15G
1 AEROSOL, FOAM RECTAL EVERY 4 HOURS
Refills: 0 | Status: DISCONTINUED | OUTPATIENT
Start: 2023-11-24 | End: 2023-11-24

## 2023-11-24 RX ORDER — IBUPROFEN 200 MG
1 TABLET ORAL
Qty: 0 | Refills: 0 | DISCHARGE
Start: 2023-11-24

## 2023-11-24 RX ORDER — HYDROCORTISONE 1 %
1 OINTMENT (GRAM) TOPICAL EVERY 6 HOURS
Refills: 0 | Status: DISCONTINUED | OUTPATIENT
Start: 2023-11-24 | End: 2023-11-24

## 2023-11-24 RX ORDER — ACETAMINOPHEN 500 MG
975 TABLET ORAL
Refills: 0 | Status: DISCONTINUED | OUTPATIENT
Start: 2023-11-24 | End: 2023-11-24

## 2023-11-24 RX ORDER — BENZOCAINE 10 %
1 GEL (GRAM) MUCOUS MEMBRANE EVERY 6 HOURS
Refills: 0 | Status: DISCONTINUED | OUTPATIENT
Start: 2023-11-24 | End: 2023-11-24

## 2023-11-24 RX ORDER — OXYTOCIN 10 UNIT/ML
41.67 VIAL (ML) INJECTION
Qty: 20 | Refills: 0 | Status: DISCONTINUED | OUTPATIENT
Start: 2023-11-24 | End: 2023-11-24

## 2023-11-24 RX ORDER — DIBUCAINE 1 %
1 OINTMENT (GRAM) RECTAL EVERY 6 HOURS
Refills: 0 | Status: DISCONTINUED | OUTPATIENT
Start: 2023-11-24 | End: 2023-11-24

## 2023-11-24 RX ORDER — ALBUTEROL 90 UG/1
2 AEROSOL, METERED ORAL
Qty: 0 | Refills: 0 | DISCHARGE

## 2023-11-24 RX ORDER — SODIUM CHLORIDE 9 MG/ML
3 INJECTION INTRAMUSCULAR; INTRAVENOUS; SUBCUTANEOUS EVERY 8 HOURS
Refills: 0 | Status: DISCONTINUED | OUTPATIENT
Start: 2023-11-24 | End: 2023-11-24

## 2023-11-24 RX ORDER — LANOLIN
1 OINTMENT (GRAM) TOPICAL EVERY 6 HOURS
Refills: 0 | Status: DISCONTINUED | OUTPATIENT
Start: 2023-11-24 | End: 2023-11-24

## 2023-11-24 RX ORDER — KETOROLAC TROMETHAMINE 30 MG/ML
30 SYRINGE (ML) INJECTION ONCE
Refills: 0 | Status: DISCONTINUED | OUTPATIENT
Start: 2023-11-24 | End: 2023-11-24

## 2023-11-24 RX ORDER — IBUPROFEN 200 MG
600 TABLET ORAL EVERY 6 HOURS
Refills: 0 | Status: DISCONTINUED | OUTPATIENT
Start: 2023-11-24 | End: 2023-11-24

## 2023-11-24 RX ORDER — INFLUENZA VIRUS VACCINE 15; 15; 15; 15 UG/.5ML; UG/.5ML; UG/.5ML; UG/.5ML
0.5 SUSPENSION INTRAMUSCULAR ONCE
Refills: 0 | Status: DISCONTINUED | OUTPATIENT
Start: 2023-11-24 | End: 2023-11-24

## 2023-11-24 RX ORDER — AER TRAVELER 0.5 G/1
1 SOLUTION RECTAL; TOPICAL EVERY 4 HOURS
Refills: 0 | Status: DISCONTINUED | OUTPATIENT
Start: 2023-11-24 | End: 2023-11-24

## 2023-11-24 RX ORDER — ACETAMINOPHEN 500 MG
3 TABLET ORAL
Qty: 0 | Refills: 0 | DISCHARGE
Start: 2023-11-24

## 2023-11-24 RX ORDER — DIPHENHYDRAMINE HCL 50 MG
25 CAPSULE ORAL EVERY 6 HOURS
Refills: 0 | Status: DISCONTINUED | OUTPATIENT
Start: 2023-11-24 | End: 2023-11-24

## 2023-11-24 RX ADMIN — Medication 30 MILLIGRAM(S): at 13:50

## 2023-11-24 RX ADMIN — Medication 30 MILLIGRAM(S): at 14:30

## 2023-11-24 NOTE — DISCHARGE NOTE OB - HOSPITAL COURSE
29 y/o  presenting at 25+1 for decreased FM, found to have IUFD. Labor was induced with cytotec. She delivered a female infant. Family opting for private burial, autopsy and cytogenetics. Pt was observed postpartum and remained clinically and hemodynamically stable.

## 2023-11-24 NOTE — DISCHARGE NOTE OB - PATIENT PORTAL LINK FT
You can access the FollowMyHealth Patient Portal offered by [facility] by registering at the following website: [email or web address]. By joining [employer]’s FollowMyHealth portal, you will also be able to view your health information using other applications (apps) compatible with our system.

## 2023-11-24 NOTE — DISCHARGE NOTE OB - INFLUENZA VACCINE ADMINISTRATION DATE:
[facility]   Prep survey completed?  Yes          Nichole Landa RN         24-Nov-2023

## 2023-11-24 NOTE — DISCHARGE NOTE OB - CARE PLAN
1 Principal Discharge DX:	Postpartum state  Assessment and plan of treatment:	Follow up in the office with your doctor in two weeks. Please call to confirm your appointment. You can eat a regular diet. Please take 600mg Mortin every 6 hours and/or Tylenol 975mg every six hours as needed for pain. No heavy lifting and nothing in the vagina until cleared by your doctor. Please call your doctor with any questions or concerns.   Principal Discharge DX:	Postpartum state  Assessment and plan of treatment:	Follow up in the office with your doctor in two weeks. Please call to confirm your appointment. You can eat a regular diet. Please take 600mg Motrin every 6 hours and/or Tylenol 975mg every six hours as needed for pain. No heavy lifting and nothing in the vagina until cleared by your doctor. Please call your doctor with any questions or concerns.

## 2023-11-24 NOTE — DISCHARGE NOTE OB - MEDICATION SUMMARY - MEDICATIONS TO TAKE
I will START or STAY ON the medications listed below when I get home from the hospital:    ibuprofen 600 mg oral tablet  -- 1 tab(s) by mouth every 6 hours  -- Indication: For Pain    acetaminophen 325 mg oral tablet  -- 3 tab(s) by mouth every 6 hours  -- Indication: For Pain    Albuterol (Eqv-ProAir HFA) 90 mcg/inh inhalation aerosol  -- 2 puff(s) inhaled 3 times a day as needed for  bronchospasm  -- Indication: For Asthma

## 2023-11-24 NOTE — DISCHARGE NOTE OB - PERSISTENT HEADACHE, BLURRED VISION
80 y/o male admitted for R MCA stroke syndrome s/p thrombectomy TICI 3 reperfusion and ABBY stenting, now s/p hemorrhagic transformation   - Was on DAPT following stenting, discontinued for hemorrhagic transformation  - currently on clopidogrel 75 mg daily  - Follow up imaging has been stable, neuro exam remains stable   - Neurochecks q1hr  - extubated to room air 12/29- reintubated 1/2/2020  - Vascular Neurology following  - SBP Goal < 160  - PT/OT/SLP  - Mechanical SCDs     Statement Selected

## 2023-11-24 NOTE — DISCHARGE NOTE OB - PLAN OF CARE
Follow up in the office with your doctor in two weeks. Please call to confirm your appointment. You can eat a regular diet. Please take 600mg Mortin every 6 hours and/or Tylenol 975mg every six hours as needed for pain. No heavy lifting and nothing in the vagina until cleared by your doctor. Please call your doctor with any questions or concerns. Follow up in the office with your doctor in two weeks. Please call to confirm your appointment. You can eat a regular diet. Please take 600mg Motrin every 6 hours and/or Tylenol 975mg every six hours as needed for pain. No heavy lifting and nothing in the vagina until cleared by your doctor. Please call your doctor with any questions or concerns.

## 2023-11-24 NOTE — DISCHARGE NOTE OB - CARE PROVIDER_API CALL
Massimo Gutierrez  Obstetrics and Gynecology  [address] [place] [id number]  Phone: [phone]  Fax: [phone]  Established Patient  Follow Up Time:

## 2023-11-25 LAB
B2 GLYCOPROT1 AB SER QL: NEGATIVE — SIGNIFICANT CHANGE UP
B2 GLYCOPROT1 AB SER QL: NEGATIVE — SIGNIFICANT CHANGE UP
CARDIOLIPIN AB SER-ACNC: NEGATIVE — SIGNIFICANT CHANGE UP
CARDIOLIPIN AB SER-ACNC: NEGATIVE — SIGNIFICANT CHANGE UP

## 2023-11-26 DIAGNOSIS — Z3A.25 25 WEEKS GESTATION OF PREGNANCY: ICD-10-CM

## 2023-11-26 DIAGNOSIS — O36.8120 DECREASED FETAL MOVEMENTS, SECOND TRIMESTER, NOT APPLICABLE OR UNSPECIFIED: ICD-10-CM

## 2023-11-26 DIAGNOSIS — Z37.1 SINGLE STILLBIRTH: ICD-10-CM

## 2023-11-26 DIAGNOSIS — O36.4XX0 MATERNAL CARE FOR INTRAUTERINE DEATH, NOT APPLICABLE OR UNSPECIFIED: ICD-10-CM

## 2023-11-27 PROBLEM — Z00.00 ENCOUNTER FOR PREVENTIVE HEALTH EXAMINATION: Status: ACTIVE | Noted: 2023-11-27

## 2023-11-29 LAB
CONFIRM APTT STACLOT: NEGATIVE — SIGNIFICANT CHANGE UP
CONFIRM APTT STACLOT: NEGATIVE — SIGNIFICANT CHANGE UP
DRVVT RATIO: 1.02 RATIO — SIGNIFICANT CHANGE UP (ref 0–1.03)
DRVVT RATIO: 1.02 RATIO — SIGNIFICANT CHANGE UP (ref 0–1.03)
DRVVT SCREEN TO CONFIRM RATIO: SIGNIFICANT CHANGE UP
DRVVT SCREEN TO CONFIRM RATIO: SIGNIFICANT CHANGE UP

## 2023-11-30 LAB
SURGICAL PATHOLOGY STUDY: SIGNIFICANT CHANGE UP
SURGICAL PATHOLOGY STUDY: SIGNIFICANT CHANGE UP

## 2023-12-06 LAB
CHROM ANALY OVERALL INTERP SPEC-IMP: SIGNIFICANT CHANGE UP
CHROM ANALY OVERALL INTERP SPEC-IMP: SIGNIFICANT CHANGE UP

## 2023-12-15 LAB
PRENATAL GENOME CHROMO MICROARRAY: ABNORMAL
PRENATAL GENOME CHROMO MICROARRAY: ABNORMAL

## 2023-12-29 ENCOUNTER — APPOINTMENT (OUTPATIENT)
Dept: ANTEPARTUM | Facility: CLINIC | Age: 28
End: 2023-12-29

## 2024-09-27 ENCOUNTER — ASOB RESULT (OUTPATIENT)
Age: 29
End: 2024-09-27

## 2024-09-27 ENCOUNTER — APPOINTMENT (OUTPATIENT)
Dept: ANTEPARTUM | Facility: CLINIC | Age: 29
End: 2024-09-27
Payer: MEDICAID

## 2024-09-27 PROCEDURE — 93976 VASCULAR STUDY: CPT

## 2024-09-27 PROCEDURE — 76813 OB US NUCHAL MEAS 1 GEST: CPT

## 2024-11-27 ENCOUNTER — APPOINTMENT (OUTPATIENT)
Dept: ANTEPARTUM | Facility: CLINIC | Age: 29
End: 2024-11-27

## 2024-11-27 ENCOUNTER — ASOB RESULT (OUTPATIENT)
Age: 29
End: 2024-11-27

## 2024-11-27 PROCEDURE — 76811 OB US DETAILED SNGL FETUS: CPT

## 2024-11-27 PROCEDURE — 76817 TRANSVAGINAL US OBSTETRIC: CPT

## 2024-11-28 ENCOUNTER — TRANSCRIPTION ENCOUNTER (OUTPATIENT)
Age: 29
End: 2024-11-28

## 2024-12-17 ENCOUNTER — APPOINTMENT (OUTPATIENT)
Dept: ANTEPARTUM | Facility: CLINIC | Age: 29
End: 2024-12-17
Payer: MEDICAID

## 2024-12-17 ENCOUNTER — ASOB RESULT (OUTPATIENT)
Age: 29
End: 2024-12-17

## 2024-12-17 PROCEDURE — 76816 OB US FOLLOW-UP PER FETUS: CPT

## 2024-12-17 PROCEDURE — 76817 TRANSVAGINAL US OBSTETRIC: CPT

## 2025-01-24 ENCOUNTER — APPOINTMENT (OUTPATIENT)
Dept: MATERNAL FETAL MEDICINE | Facility: CLINIC | Age: 30
End: 2025-01-24

## 2025-01-28 ENCOUNTER — ASOB RESULT (OUTPATIENT)
Age: 30
End: 2025-01-28

## 2025-01-28 ENCOUNTER — APPOINTMENT (OUTPATIENT)
Dept: ANTEPARTUM | Facility: CLINIC | Age: 30
End: 2025-01-28
Payer: MEDICAID

## 2025-01-28 PROCEDURE — 76818 FETAL BIOPHYS PROFILE W/NST: CPT | Mod: 59

## 2025-01-28 PROCEDURE — 76820 UMBILICAL ARTERY ECHO: CPT | Mod: 59

## 2025-01-28 PROCEDURE — 76816 OB US FOLLOW-UP PER FETUS: CPT

## 2025-02-03 ENCOUNTER — APPOINTMENT (OUTPATIENT)
Dept: MATERNAL FETAL MEDICINE | Facility: CLINIC | Age: 30
End: 2025-02-03
Payer: MEDICAID

## 2025-02-03 PROCEDURE — 99204 OFFICE O/P NEW MOD 45 MIN: CPT | Mod: 95

## 2025-02-11 ENCOUNTER — ASOB RESULT (OUTPATIENT)
Age: 30
End: 2025-02-11

## 2025-02-11 ENCOUNTER — APPOINTMENT (OUTPATIENT)
Dept: ANTEPARTUM | Facility: CLINIC | Age: 30
End: 2025-02-11
Payer: MEDICAID

## 2025-02-11 PROCEDURE — 76818 FETAL BIOPHYS PROFILE W/NST: CPT

## 2025-02-11 PROCEDURE — 76820 UMBILICAL ARTERY ECHO: CPT | Mod: 59

## 2025-02-11 PROCEDURE — 76816 OB US FOLLOW-UP PER FETUS: CPT

## 2025-02-27 ENCOUNTER — ASOB RESULT (OUTPATIENT)
Age: 30
End: 2025-02-27

## 2025-02-27 ENCOUNTER — APPOINTMENT (OUTPATIENT)
Dept: ANTEPARTUM | Facility: CLINIC | Age: 30
End: 2025-02-27
Payer: MEDICAID

## 2025-02-27 PROCEDURE — 76816 OB US FOLLOW-UP PER FETUS: CPT

## 2025-02-27 PROCEDURE — 76820 UMBILICAL ARTERY ECHO: CPT | Mod: 59

## 2025-02-27 PROCEDURE — 76818 FETAL BIOPHYS PROFILE W/NST: CPT

## 2025-03-03 ENCOUNTER — ASOB RESULT (OUTPATIENT)
Age: 30
End: 2025-03-03

## 2025-03-03 ENCOUNTER — APPOINTMENT (OUTPATIENT)
Dept: ANTEPARTUM | Facility: CLINIC | Age: 30
End: 2025-03-03
Payer: MEDICAID

## 2025-03-03 PROCEDURE — 76815 OB US LIMITED FETUS(S): CPT

## 2025-03-03 PROCEDURE — 76820 UMBILICAL ARTERY ECHO: CPT

## 2025-03-03 PROCEDURE — 76818 FETAL BIOPHYS PROFILE W/NST: CPT

## 2025-03-09 ENCOUNTER — OUTPATIENT (OUTPATIENT)
Dept: OUTPATIENT SERVICES | Facility: HOSPITAL | Age: 30
LOS: 1 days | End: 2025-03-09
Payer: COMMERCIAL

## 2025-03-09 VITALS
OXYGEN SATURATION: 99 % | TEMPERATURE: 98 F | RESPIRATION RATE: 18 BRPM | DIASTOLIC BLOOD PRESSURE: 75 MMHG | SYSTOLIC BLOOD PRESSURE: 146 MMHG | HEART RATE: 77 BPM

## 2025-03-09 DIAGNOSIS — O26.899 OTHER SPECIFIED PREGNANCY RELATED CONDITIONS, UNSPECIFIED TRIMESTER: ICD-10-CM

## 2025-03-09 LAB
ALBUMIN SERPL ELPH-MCNC: 3.3 G/DL — SIGNIFICANT CHANGE UP (ref 3.3–5)
ALBUMIN SERPL ELPH-MCNC: 3.5 G/DL — SIGNIFICANT CHANGE UP (ref 3.3–5)
ALP SERPL-CCNC: 103 U/L — SIGNIFICANT CHANGE UP (ref 40–120)
ALP SERPL-CCNC: 99 U/L — SIGNIFICANT CHANGE UP (ref 40–120)
ALT FLD-CCNC: 11 U/L — SIGNIFICANT CHANGE UP (ref 10–45)
ALT FLD-CCNC: SIGNIFICANT CHANGE UP (ref 10–45)
ANION GAP SERPL CALC-SCNC: 11 MMOL/L — SIGNIFICANT CHANGE UP (ref 5–17)
ANION GAP SERPL CALC-SCNC: 12 MMOL/L — SIGNIFICANT CHANGE UP (ref 5–17)
AST SERPL-CCNC: 15 U/L — SIGNIFICANT CHANGE UP (ref 10–40)
AST SERPL-CCNC: SIGNIFICANT CHANGE UP (ref 10–40)
BASOPHILS # BLD AUTO: 0.01 K/UL — SIGNIFICANT CHANGE UP (ref 0–0.2)
BASOPHILS NFR BLD AUTO: 0.1 % — SIGNIFICANT CHANGE UP (ref 0–2)
BILIRUB SERPL-MCNC: 0.3 MG/DL — SIGNIFICANT CHANGE UP (ref 0.2–1.2)
BILIRUB SERPL-MCNC: 0.4 MG/DL — SIGNIFICANT CHANGE UP (ref 0.2–1.2)
BUN SERPL-MCNC: 3 MG/DL — LOW (ref 7–23)
BUN SERPL-MCNC: 3 MG/DL — LOW (ref 7–23)
CALCIUM SERPL-MCNC: 9 MG/DL — SIGNIFICANT CHANGE UP (ref 8.4–10.5)
CALCIUM SERPL-MCNC: 9.1 MG/DL — SIGNIFICANT CHANGE UP (ref 8.4–10.5)
CHLORIDE SERPL-SCNC: 107 MMOL/L — SIGNIFICANT CHANGE UP (ref 96–108)
CHLORIDE SERPL-SCNC: 107 MMOL/L — SIGNIFICANT CHANGE UP (ref 96–108)
CO2 SERPL-SCNC: 19 MMOL/L — LOW (ref 22–31)
CO2 SERPL-SCNC: 19 MMOL/L — LOW (ref 22–31)
CREAT ?TM UR-MCNC: 25 MG/DL — SIGNIFICANT CHANGE UP
CREAT SERPL-MCNC: 0.41 MG/DL — LOW (ref 0.5–1.3)
CREAT SERPL-MCNC: 0.41 MG/DL — LOW (ref 0.5–1.3)
EGFR: 138 ML/MIN/1.73M2 — SIGNIFICANT CHANGE UP
EOSINOPHIL # BLD AUTO: 0.03 K/UL — SIGNIFICANT CHANGE UP (ref 0–0.5)
EOSINOPHIL NFR BLD AUTO: 0.4 % — SIGNIFICANT CHANGE UP (ref 0–6)
GLUCOSE SERPL-MCNC: 116 MG/DL — HIGH (ref 70–99)
GLUCOSE SERPL-MCNC: 76 MG/DL — SIGNIFICANT CHANGE UP (ref 70–99)
HCT VFR BLD CALC: 38.3 % — SIGNIFICANT CHANGE UP (ref 34.5–45)
HGB BLD-MCNC: 12.5 G/DL — SIGNIFICANT CHANGE UP (ref 11.5–15.5)
IMM GRANULOCYTES NFR BLD AUTO: 0.3 % — SIGNIFICANT CHANGE UP (ref 0–0.9)
LDH SERPL L TO P-CCNC: SIGNIFICANT CHANGE UP U/L (ref 50–242)
LYMPHOCYTES # BLD AUTO: 1.81 K/UL — SIGNIFICANT CHANGE UP (ref 1–3.3)
LYMPHOCYTES # BLD AUTO: 23.6 % — SIGNIFICANT CHANGE UP (ref 13–44)
MCHC RBC-ENTMCNC: 29.5 PG — SIGNIFICANT CHANGE UP (ref 27–34)
MCHC RBC-ENTMCNC: 32.6 G/DL — SIGNIFICANT CHANGE UP (ref 32–36)
MCV RBC AUTO: 90.3 FL — SIGNIFICANT CHANGE UP (ref 80–100)
MONOCYTES # BLD AUTO: 0.52 K/UL — SIGNIFICANT CHANGE UP (ref 0–0.9)
MONOCYTES NFR BLD AUTO: 6.8 % — SIGNIFICANT CHANGE UP (ref 2–14)
NEUTROPHILS # BLD AUTO: 5.27 K/UL — SIGNIFICANT CHANGE UP (ref 1.8–7.4)
NEUTROPHILS NFR BLD AUTO: 68.8 % — SIGNIFICANT CHANGE UP (ref 43–77)
NRBC BLD AUTO-RTO: 0 /100 WBCS — SIGNIFICANT CHANGE UP (ref 0–0)
PLATELET # BLD AUTO: 202 K/UL — SIGNIFICANT CHANGE UP (ref 150–400)
POTASSIUM SERPL-MCNC: 3.5 MMOL/L — SIGNIFICANT CHANGE UP (ref 3.5–5.3)
POTASSIUM SERPL-MCNC: SIGNIFICANT CHANGE UP (ref 3.5–5.3)
POTASSIUM SERPL-SCNC: 3.5 MMOL/L — SIGNIFICANT CHANGE UP (ref 3.5–5.3)
POTASSIUM SERPL-SCNC: SIGNIFICANT CHANGE UP (ref 3.5–5.3)
PROT ?TM UR-MCNC: 5 MG/DL — SIGNIFICANT CHANGE UP (ref 0–12)
PROT SERPL-MCNC: 6.5 G/DL — SIGNIFICANT CHANGE UP (ref 6–8.3)
PROT SERPL-MCNC: 6.5 G/DL — SIGNIFICANT CHANGE UP (ref 6–8.3)
PROT/CREAT UR-RTO: 0.2 RATIO — SIGNIFICANT CHANGE UP (ref 0–0.2)
RBC # BLD: 4.24 M/UL — SIGNIFICANT CHANGE UP (ref 3.8–5.2)
RBC # FLD: 14.6 % — HIGH (ref 10.3–14.5)
SODIUM SERPL-SCNC: 137 MMOL/L — SIGNIFICANT CHANGE UP (ref 135–145)
SODIUM SERPL-SCNC: 138 MMOL/L — SIGNIFICANT CHANGE UP (ref 135–145)
URATE SERPL-MCNC: 4.7 MG/DL — SIGNIFICANT CHANGE UP (ref 2.5–7)
WBC # BLD: 7.66 K/UL — SIGNIFICANT CHANGE UP (ref 3.8–10.5)
WBC # FLD AUTO: 7.66 K/UL — SIGNIFICANT CHANGE UP (ref 3.8–10.5)

## 2025-03-09 PROCEDURE — 84550 ASSAY OF BLOOD/URIC ACID: CPT

## 2025-03-09 PROCEDURE — 82962 GLUCOSE BLOOD TEST: CPT

## 2025-03-09 PROCEDURE — 84156 ASSAY OF PROTEIN URINE: CPT

## 2025-03-09 PROCEDURE — 82570 ASSAY OF URINE CREATININE: CPT

## 2025-03-09 PROCEDURE — 99214 OFFICE O/P EST MOD 30 MIN: CPT

## 2025-03-09 PROCEDURE — 85025 COMPLETE CBC W/AUTO DIFF WBC: CPT

## 2025-03-09 PROCEDURE — 80053 COMPREHEN METABOLIC PANEL: CPT

## 2025-03-09 PROCEDURE — 59025 FETAL NON-STRESS TEST: CPT

## 2025-03-09 PROCEDURE — 83615 LACTATE (LD) (LDH) ENZYME: CPT

## 2025-03-09 PROCEDURE — 36415 COLL VENOUS BLD VENIPUNCTURE: CPT

## 2025-03-09 NOTE — OB RN TRIAGE NOTE - NSNURSINGINSTR_OBGYN_ALL_OB_FT
Instructed to return if abdominal pains, vaginal bleeding, leakage of fluids or if she does not feel the baby moving.  Verbalized understanding of all said.  Left unit with significant other in no distress.

## 2025-03-09 NOTE — OB RN TRIAGE NOTE - FALL HARM RISK - UNIVERSAL INTERVENTIONS
abdominal pain/flu-like symptoms
Bed in lowest position, wheels locked, appropriate side rails in place/Call bell, personal items and telephone in reach/Instruct patient to call for assistance before getting out of bed or chair/Non-slip footwear when patient is out of bed/[place] to call system/Physically safe environment - no spills, clutter or unnecessary equipment/Purposeful Proactive Rounding/Room/bathroom lighting operational, light cord in reach

## 2025-03-09 NOTE — OB PROVIDER TRIAGE NOTE - HISTORY OF PRESENT ILLNESS
Patient is a 30yo  at 36w0d presenting for decreased fetal movement. Patient reports starting to notice reduced movement this morning. She approximates feeling baby 10 times during the day. Patient with known diagnosis of gHTN with mild range BPs in triage. She denies toxic complaints such as headache, scotoma, CP, SOB, RUQ/epgiasric pain. - LOF - VB - CTX    Ante: Spontaneous pregnancy. NIPT and anatomy scan wnl. Passed GCT. Normotensive. GBS unknown    OBHX:  G1 -  MAB, med  G2 -  2nd trimester demise delivered vaginally   GynHX: denies fibroids, ovarian cysts, abnormal pap smear, STI/herpes.   MedHX: asthma uses albuterol inhaler occasionally, never intubated or hospitalized   Surghx: tonsillectomy in childhood  Medications: PNV  Allergies: NKDA    Physical Exam:  T(C): 36.8 (25 @ 17:22), Max: 36.8 (25 @ 17:22)  HR: 77 (25 @ 17:22) (77 - 77)  BP: 146/75 (25 @ 17:22) (146/75 - 146/75)  RR: 18 (25 @ 17:22) (18 - 18)  SpO2: 99% (25 @ 17:22) (99% - 99%)    General: NAD  Pulm: no increased WOB  Abdomen: soft, gravid, nontender  Extremities: wnl   TAUS: Breech. Placenta anterior. PAMELA 9.3. BPP 8/8.   VE: Deferred    EFM: 140 bpm, mod variability, + accels, - decels; reactive and reassuring  [place]: no ctx        A/p: 30yo  at 36w0d presenting for decreased fetal movement  - Patient reports return to baseline fetal movement with over 10 movement in a 30 min period during her evaluation in triage  - Fetal status is reassuring given BPP 8/8, appropriate PAMELA, reactive and reassuring NST  - Patient with known diagnosis of gHTN and mild range BPs in triage  - PEC labs reassuring with P/C 0.2  - CMP resent due to missing LFTs  - Patient planned for a pCS at 37weeks for gHTN with breech presentation.  - Patient kept for monitoring until lab results are back to r/o severe features of PEC. Otherwise plan to discharge patient home with strict return precautions to come back if she experiences vaginal bleeding, LOF, contractions, or decreased fetal movement. Scheduled for MFM scan on Tuesday.     Discussed with Dr. Kristofer Wilson PGY4 and attending Dr. Duran. Night time to follow up pending lab results.   Alejandra Hennessy PGY1

## 2025-03-11 ENCOUNTER — ASOB RESULT (OUTPATIENT)
Age: 30
End: 2025-03-11

## 2025-03-11 ENCOUNTER — APPOINTMENT (OUTPATIENT)
Dept: ANTEPARTUM | Facility: CLINIC | Age: 30
End: 2025-03-11
Payer: MEDICAID

## 2025-03-11 PROBLEM — J45.909 UNSPECIFIED ASTHMA, UNCOMPLICATED: Chronic | Status: ACTIVE | Noted: 2025-03-09

## 2025-03-11 PROCEDURE — 76816 OB US FOLLOW-UP PER FETUS: CPT

## 2025-03-11 PROCEDURE — 76820 UMBILICAL ARTERY ECHO: CPT | Mod: 59

## 2025-03-11 PROCEDURE — 76818 FETAL BIOPHYS PROFILE W/NST: CPT | Mod: 59

## 2025-03-18 ENCOUNTER — OUTPATIENT (OUTPATIENT)
Dept: OUTPATIENT SERVICES | Facility: HOSPITAL | Age: 30
LOS: 1 days | End: 2025-03-18
Payer: COMMERCIAL

## 2025-03-18 ENCOUNTER — APPOINTMENT (OUTPATIENT)
Dept: ANTEPARTUM | Facility: CLINIC | Age: 30
End: 2025-03-18

## 2025-03-18 DIAGNOSIS — Z01.818 ENCOUNTER FOR OTHER PREPROCEDURAL EXAMINATION: ICD-10-CM

## 2025-03-18 LAB
ANION GAP SERPL CALC-SCNC: 10 MMOL/L — SIGNIFICANT CHANGE UP (ref 5–17)
BLD GP AB SCN SERPL QL: NEGATIVE — SIGNIFICANT CHANGE UP
BUN SERPL-MCNC: 4 MG/DL — LOW (ref 7–23)
CALCIUM SERPL-MCNC: 9.1 MG/DL — SIGNIFICANT CHANGE UP (ref 8.4–10.5)
CO2 SERPL-SCNC: 25 MMOL/L — SIGNIFICANT CHANGE UP (ref 22–31)
CREAT SERPL-MCNC: 0.47 MG/DL — LOW (ref 0.5–1.3)
EGFR: 134 ML/MIN/1.73M2 — SIGNIFICANT CHANGE UP
EGFR: 134 ML/MIN/1.73M2 — SIGNIFICANT CHANGE UP
GLUCOSE SERPL-MCNC: 104 MG/DL — HIGH (ref 70–99)
HGB BLD-MCNC: 12.5 G/DL — SIGNIFICANT CHANGE UP (ref 11.5–15.5)
MCHC RBC-ENTMCNC: 30 PG — SIGNIFICANT CHANGE UP (ref 27–34)
MCHC RBC-ENTMCNC: 32.9 G/DL — SIGNIFICANT CHANGE UP (ref 32–36)
MCV RBC AUTO: 91.1 FL — SIGNIFICANT CHANGE UP (ref 80–100)
NRBC BLD AUTO-RTO: 0 /100 WBCS — SIGNIFICANT CHANGE UP (ref 0–0)
PLATELET # BLD AUTO: 176 K/UL — SIGNIFICANT CHANGE UP (ref 150–400)
POTASSIUM SERPL-MCNC: 3.8 MMOL/L — SIGNIFICANT CHANGE UP (ref 3.5–5.3)
POTASSIUM SERPL-SCNC: 3.8 MMOL/L — SIGNIFICANT CHANGE UP (ref 3.5–5.3)
RBC # BLD: 4.17 M/UL — SIGNIFICANT CHANGE UP (ref 3.8–5.2)
RBC # FLD: 14.9 % — HIGH (ref 10.3–14.5)
RH IG SCN BLD-IMP: POSITIVE — SIGNIFICANT CHANGE UP
SODIUM SERPL-SCNC: 140 MMOL/L — SIGNIFICANT CHANGE UP (ref 135–145)
WBC # BLD: 6.53 K/UL — SIGNIFICANT CHANGE UP (ref 3.8–10.5)
WBC # FLD AUTO: 6.53 K/UL — SIGNIFICANT CHANGE UP (ref 3.8–10.5)

## 2025-03-18 PROCEDURE — 86850 RBC ANTIBODY SCREEN: CPT

## 2025-03-18 PROCEDURE — 86901 BLOOD TYPING SEROLOGIC RH(D): CPT

## 2025-03-18 PROCEDURE — 86900 BLOOD TYPING SEROLOGIC ABO: CPT

## 2025-03-18 PROCEDURE — 80048 BASIC METABOLIC PNL TOTAL CA: CPT

## 2025-03-18 PROCEDURE — 85027 COMPLETE CBC AUTOMATED: CPT

## 2025-03-21 ENCOUNTER — INPATIENT (INPATIENT)
Facility: HOSPITAL | Age: 30
LOS: 2 days | Discharge: HOME | End: 2025-03-24
Attending: OBSTETRICS & GYNECOLOGY | Admitting: OBSTETRICS & GYNECOLOGY
Payer: COMMERCIAL

## 2025-03-21 VITALS
HEART RATE: 88 BPM | WEIGHT: 194.01 LBS | SYSTOLIC BLOOD PRESSURE: 136 MMHG | DIASTOLIC BLOOD PRESSURE: 78 MMHG | RESPIRATION RATE: 18 BRPM | TEMPERATURE: 99 F | OXYGEN SATURATION: 99 % | HEIGHT: 60 IN

## 2025-03-21 DIAGNOSIS — Z90.89 ACQUIRED ABSENCE OF OTHER ORGANS: Chronic | ICD-10-CM

## 2025-03-21 LAB
ALBUMIN SERPL ELPH-MCNC: 3.7 G/DL — SIGNIFICANT CHANGE UP (ref 3.3–5)
ALP SERPL-CCNC: 110 U/L — SIGNIFICANT CHANGE UP (ref 40–120)
ALT FLD-CCNC: 11 U/L — SIGNIFICANT CHANGE UP (ref 10–45)
ANION GAP SERPL CALC-SCNC: 13 MMOL/L — SIGNIFICANT CHANGE UP (ref 5–17)
APTT BLD: 26.7 SEC — SIGNIFICANT CHANGE UP (ref 24.5–35.6)
AST SERPL-CCNC: 17 U/L — SIGNIFICANT CHANGE UP (ref 10–40)
BASOPHILS # BLD AUTO: 0.01 K/UL — SIGNIFICANT CHANGE UP (ref 0–0.2)
BASOPHILS NFR BLD AUTO: 0.1 % — SIGNIFICANT CHANGE UP (ref 0–2)
BILIRUB SERPL-MCNC: 0.4 MG/DL — SIGNIFICANT CHANGE UP (ref 0.2–1.2)
BLD GP AB SCN SERPL QL: NEGATIVE — SIGNIFICANT CHANGE UP
BUN SERPL-MCNC: 4 MG/DL — LOW (ref 7–23)
CALCIUM SERPL-MCNC: 9 MG/DL — SIGNIFICANT CHANGE UP (ref 8.4–10.5)
CHLORIDE SERPL-SCNC: 105 MMOL/L — SIGNIFICANT CHANGE UP (ref 96–108)
CO2 SERPL-SCNC: 21 MMOL/L — LOW (ref 22–31)
CREAT ?TM UR-MCNC: 26 MG/DL — SIGNIFICANT CHANGE UP
CREAT SERPL-MCNC: 0.43 MG/DL — LOW (ref 0.5–1.3)
EGFR: 137 ML/MIN/1.73M2 — SIGNIFICANT CHANGE UP
EGFR: 137 ML/MIN/1.73M2 — SIGNIFICANT CHANGE UP
EOSINOPHIL # BLD AUTO: 0.01 K/UL — SIGNIFICANT CHANGE UP (ref 0–0.5)
EOSINOPHIL NFR BLD AUTO: 0.1 % — SIGNIFICANT CHANGE UP (ref 0–6)
FIBRINOGEN PPP-MCNC: 509 MG/DL — HIGH (ref 200–445)
GLUCOSE SERPL-MCNC: 69 MG/DL — LOW (ref 70–99)
HCT VFR BLD CALC: 37.8 % — SIGNIFICANT CHANGE UP (ref 34.5–45)
HGB BLD-MCNC: 12.5 G/DL — SIGNIFICANT CHANGE UP (ref 11.5–15.5)
IMM GRANULOCYTES NFR BLD AUTO: 0.4 % — SIGNIFICANT CHANGE UP (ref 0–0.9)
INR BLD: 0.96 — SIGNIFICANT CHANGE UP (ref 0.85–1.16)
LDH SERPL L TO P-CCNC: 222 U/L — SIGNIFICANT CHANGE UP (ref 50–242)
LYMPHOCYTES # BLD AUTO: 1.8 K/UL — SIGNIFICANT CHANGE UP (ref 1–3.3)
LYMPHOCYTES # BLD AUTO: 23.9 % — SIGNIFICANT CHANGE UP (ref 13–44)
MCHC RBC-ENTMCNC: 29.7 PG — SIGNIFICANT CHANGE UP (ref 27–34)
MCHC RBC-ENTMCNC: 33.1 G/DL — SIGNIFICANT CHANGE UP (ref 32–36)
MCV RBC AUTO: 89.8 FL — SIGNIFICANT CHANGE UP (ref 80–100)
MONOCYTES # BLD AUTO: 0.46 K/UL — SIGNIFICANT CHANGE UP (ref 0–0.9)
MONOCYTES NFR BLD AUTO: 6.1 % — SIGNIFICANT CHANGE UP (ref 2–14)
NEUTROPHILS # BLD AUTO: 5.22 K/UL — SIGNIFICANT CHANGE UP (ref 1.8–7.4)
NEUTROPHILS NFR BLD AUTO: 69.4 % — SIGNIFICANT CHANGE UP (ref 43–77)
NRBC BLD AUTO-RTO: 0 /100 WBCS — SIGNIFICANT CHANGE UP (ref 0–0)
PLATELET # BLD AUTO: 180 K/UL — SIGNIFICANT CHANGE UP (ref 150–400)
POTASSIUM SERPL-MCNC: 3.9 MMOL/L — SIGNIFICANT CHANGE UP (ref 3.5–5.3)
POTASSIUM SERPL-SCNC: 3.9 MMOL/L — SIGNIFICANT CHANGE UP (ref 3.5–5.3)
PROT ?TM UR-MCNC: 20 MG/DL — HIGH (ref 0–12)
PROT SERPL-MCNC: 6.6 G/DL — SIGNIFICANT CHANGE UP (ref 6–8.3)
PROT/CREAT UR-RTO: 0.8 RATIO — HIGH (ref 0–0.2)
PROTHROM AB SERPL-ACNC: 11.1 SEC — SIGNIFICANT CHANGE UP (ref 9.9–13.4)
RBC # BLD: 4.21 M/UL — SIGNIFICANT CHANGE UP (ref 3.8–5.2)
RBC # FLD: 14.6 % — HIGH (ref 10.3–14.5)
RH IG SCN BLD-IMP: POSITIVE — SIGNIFICANT CHANGE UP
SODIUM SERPL-SCNC: 139 MMOL/L — SIGNIFICANT CHANGE UP (ref 135–145)
URATE SERPL-MCNC: 5.5 MG/DL — SIGNIFICANT CHANGE UP (ref 2.5–7)
WBC # BLD: 7.53 K/UL — SIGNIFICANT CHANGE UP (ref 3.8–10.5)
WBC # FLD AUTO: 7.53 K/UL — SIGNIFICANT CHANGE UP (ref 3.8–10.5)

## 2025-03-21 RX ORDER — ENOXAPARIN SODIUM 100 MG/ML
40 INJECTION SUBCUTANEOUS EVERY 24 HOURS
Refills: 0 | Status: DISCONTINUED | OUTPATIENT
Start: 2025-03-22 | End: 2025-03-24

## 2025-03-21 RX ORDER — ACETAMINOPHEN 500 MG/5ML
1000 LIQUID (ML) ORAL ONCE
Refills: 0 | Status: COMPLETED | OUTPATIENT
Start: 2025-03-21 | End: 2025-03-21

## 2025-03-21 RX ORDER — NALOXONE HYDROCHLORIDE 0.4 MG/ML
0.1 INJECTION, SOLUTION INTRAMUSCULAR; INTRAVENOUS; SUBCUTANEOUS
Refills: 0 | Status: DISCONTINUED | OUTPATIENT
Start: 2025-03-21 | End: 2025-03-24

## 2025-03-21 RX ORDER — CEFAZOLIN SODIUM IN 0.9 % NACL 3 G/100 ML
2000 INTRAVENOUS SOLUTION, PIGGYBACK (ML) INTRAVENOUS ONCE
Refills: 0 | Status: COMPLETED | OUTPATIENT
Start: 2025-03-21 | End: 2025-03-21

## 2025-03-21 RX ORDER — SODIUM CHLORIDE 9 G/1000ML
1000 INJECTION, SOLUTION INTRAVENOUS
Refills: 0 | Status: DISCONTINUED | OUTPATIENT
Start: 2025-03-21 | End: 2025-03-24

## 2025-03-21 RX ORDER — SIMETHICONE 80 MG
80 TABLET,CHEWABLE ORAL EVERY 4 HOURS
Refills: 0 | Status: DISCONTINUED | OUTPATIENT
Start: 2025-03-21 | End: 2025-03-24

## 2025-03-21 RX ORDER — CITRIC ACID/SODIUM CITRATE 300-500 MG
30 SOLUTION, ORAL ORAL ONCE
Refills: 0 | Status: COMPLETED | OUTPATIENT
Start: 2025-03-21 | End: 2025-03-21

## 2025-03-21 RX ORDER — FERROUS SULFATE 137(45) MG
0 TABLET, EXTENDED RELEASE ORAL
Refills: 0 | DISCHARGE

## 2025-03-21 RX ORDER — IBUPROFEN 200 MG
600 TABLET ORAL EVERY 6 HOURS
Refills: 0 | Status: COMPLETED | OUTPATIENT
Start: 2025-03-21 | End: 2026-02-17

## 2025-03-21 RX ORDER — FERROUS SULFATE 137(45) MG
1 TABLET, EXTENDED RELEASE ORAL
Refills: 0 | DISCHARGE

## 2025-03-21 RX ORDER — ONDANSETRON HCL/PF 4 MG/2 ML
4 VIAL (ML) INJECTION EVERY 6 HOURS
Refills: 0 | Status: DISCONTINUED | OUTPATIENT
Start: 2025-03-21 | End: 2025-03-24

## 2025-03-21 RX ORDER — OXYCODONE HYDROCHLORIDE 30 MG/1
5 TABLET ORAL ONCE
Refills: 0 | Status: DISCONTINUED | OUTPATIENT
Start: 2025-03-21 | End: 2025-03-24

## 2025-03-21 RX ORDER — OXYCODONE HYDROCHLORIDE 30 MG/1
5 TABLET ORAL
Refills: 0 | Status: DISCONTINUED | OUTPATIENT
Start: 2025-03-21 | End: 2025-03-24

## 2025-03-21 RX ORDER — DIPHENHYDRAMINE HCL 12.5MG/5ML
25 ELIXIR ORAL EVERY 6 HOURS
Refills: 0 | Status: DISCONTINUED | OUTPATIENT
Start: 2025-03-21 | End: 2025-03-24

## 2025-03-21 RX ORDER — DEXAMETHASONE 0.5 MG/1
4 TABLET ORAL EVERY 6 HOURS
Refills: 0 | Status: DISCONTINUED | OUTPATIENT
Start: 2025-03-21 | End: 2025-03-24

## 2025-03-21 RX ORDER — PRENATAL 136/IRON/FOLIC ACID 27 MG-1 MG
1 TABLET ORAL
Refills: 0 | DISCHARGE

## 2025-03-21 RX ORDER — ACETAMINOPHEN 500 MG/5ML
975 LIQUID (ML) ORAL
Refills: 0 | Status: DISCONTINUED | OUTPATIENT
Start: 2025-03-21 | End: 2025-03-24

## 2025-03-21 RX ORDER — KETOROLAC TROMETHAMINE 30 MG/ML
30 INJECTION, SOLUTION INTRAMUSCULAR; INTRAVENOUS EVERY 6 HOURS
Refills: 0 | Status: DISCONTINUED | OUTPATIENT
Start: 2025-03-21 | End: 2025-03-23

## 2025-03-21 RX ORDER — MAGNESIUM HYDROXIDE 400 MG/5ML
30 SUSPENSION ORAL
Refills: 0 | Status: DISCONTINUED | OUTPATIENT
Start: 2025-03-21 | End: 2025-03-24

## 2025-03-21 RX ORDER — MODIFIED LANOLIN 100 %
1 CREAM (GRAM) TOPICAL EVERY 6 HOURS
Refills: 0 | Status: DISCONTINUED | OUTPATIENT
Start: 2025-03-21 | End: 2025-03-24

## 2025-03-21 RX ORDER — CLOSTRIDIUM TETANI TOXOID ANTIGEN (FORMALDEHYDE INACTIVATED), CORYNEBACTERIUM DIPHTHERIAE TOXOID ANTIGEN (FORMALDEHYDE INACTIVATED), BORDETELLA PERTUSSIS TOXOID ANTIGEN (GLUTARALDEHYDE INACTIVATED), BORDETELLA PERTUSSIS FILAMENTOUS HEMAGGLUTININ ANTIGEN (FORMALDEHYDE INACTIVATED), BORDETELLA PERTUSSIS PERTACTIN ANTIGEN, AND BORDETELLA PERTUSSIS FIMBRIAE 2/3 ANTIGEN 5; 2; 2.5; 5; 3; 5 [LF]/.5ML; [LF]/.5ML; UG/.5ML; UG/.5ML; UG/.5ML; UG/.5ML
0.5 INJECTION, SUSPENSION INTRAMUSCULAR ONCE
Refills: 0 | Status: DISCONTINUED | OUTPATIENT
Start: 2025-03-21 | End: 2025-03-24

## 2025-03-21 RX ORDER — OXYTOCIN-SODIUM CHLORIDE 0.9% IV SOLN 30 UNIT/500ML 30-0.9/5 UT/ML-%
42 SOLUTION INTRAVENOUS
Qty: 30 | Refills: 0 | Status: DISCONTINUED | OUTPATIENT
Start: 2025-03-21 | End: 2025-03-24

## 2025-03-21 RX ADMIN — Medication 1 APPLICATION(S): at 13:35

## 2025-03-21 RX ADMIN — KETOROLAC TROMETHAMINE 30 MILLIGRAM(S): 30 INJECTION, SOLUTION INTRAMUSCULAR; INTRAVENOUS at 23:49

## 2025-03-21 RX ADMIN — KETOROLAC TROMETHAMINE 30 MILLIGRAM(S): 30 INJECTION, SOLUTION INTRAMUSCULAR; INTRAVENOUS at 19:02

## 2025-03-21 RX ADMIN — Medication 30 MILLILITER(S): at 14:34

## 2025-03-21 RX ADMIN — OXYTOCIN-SODIUM CHLORIDE 0.9% IV SOLN 30 UNIT/500ML 42 MILLIUNIT(S)/MIN: 30-0.9/5 SOLUTION at 17:45

## 2025-03-21 RX ADMIN — SODIUM CHLORIDE 200 MILLILITER(S): 9 INJECTION, SOLUTION INTRAVENOUS at 15:35

## 2025-03-21 RX ADMIN — Medication 20 MILLIGRAM(S): at 14:34

## 2025-03-21 RX ADMIN — Medication 400 MILLIGRAM(S): at 17:45

## 2025-03-21 RX ADMIN — Medication 1000 MILLIGRAM(S): at 18:45

## 2025-03-21 RX ADMIN — Medication 80 MILLIGRAM(S): at 23:50

## 2025-03-21 RX ADMIN — KETOROLAC TROMETHAMINE 30 MILLIGRAM(S): 30 INJECTION, SOLUTION INTRAMUSCULAR; INTRAVENOUS at 20:06

## 2025-03-21 RX ADMIN — Medication 100 MILLIGRAM(S): at 15:15

## 2025-03-21 NOTE — OB PROVIDER H&P - NSLOWPPHRISK_OBGYN_A_OB
No previous uterine incision/[place] Pregnancy/Less than or equal to 4 previous vaginal births/No known bleeding disorder/No history of postpartum hemorrhage/No other PPH risks indicated

## 2025-03-21 NOTE — OB PROVIDER H&P - ASSESSMENT
30 yo  at 37w5d presenting for scheduled pCS for gHTN and breech presentation  - Admit to L&D  - Consent signed fo pCD   - NPO  - IV fluids and labs ordered  - 2g Ancef   - Continuous EFM       Alejandra Hennessy PGY1

## 2025-03-21 NOTE — OB PROVIDER DELIVERY SUMMARY - NSPROVIDERDELIVERYNOTE_OBGYN_ALL_OB_FT
Low transverse  for breech presentation and gestational hypertension. Fetus in breech presentation. Fluid clear. No difficulty with delivery. Uterus closed in one layer with vicryl. Fascia closed with vicryl. Subcutaneous closed in two layers plain gut. Skin closed with monocryl. . IVF . .

## 2025-03-21 NOTE — OB PROVIDER DELIVERY SUMMARY - NSSELHIDDEN_OBGYN_ALL_OB_FT
[NS_DeliveryAttending1_OBGYN_ALL_OB_FT:[id number]==],[NS_DeliveryAssist1_OBGYN_ALL_OB_FT:[id number]],[NS_DeliveryRN_OBGYN_ALL_OB_FT:[id number]],[NS_CirculateRN2_OBGYN_ALL_OB_FT:[id number]]

## 2025-03-21 NOTE — OB PROVIDER H&P - HISTORY OF PRESENT ILLNESS
Patient is a 30yo  at 37w5d presenting for scheduled pCS for breech presentation and gHTN.  - LOF - VB - CTX +FM    Ante: Spontaneous pregnancy. NIPT and anatomy scan wnl. Pregnancy c/b GDMA1 and gHTN diagnosed at 36 weeks.   EFW 2800  GBS neg    OBHX:  G1 -  MAB, med  G2 -  2nd trimester demise delivered vaginally   GynHX: denies fibroids, ovarian cysts, abnormal pap smear, STI/herpes.   MedHX: asthma uses albuterol inhaler occasionally, never intubated or hospitalized   Surghx: tonsillectomy in childhood  Medications: PNV  Allergies: NKDA    Physical Exam:  T(C): 37.2 (25 @ 12:51), Max: 37.2 (25 @ 12:51)  HR: 88 (25 @ 12:51) (88 - 88)  BP: 136/78 (25 @ 12:51) (136/78 - 136/78)  RR: 18 (25 @ 12:51) (18 - 18)  SpO2: 99% (25 @ 12:51) (99% - 99%)    General: NAD  Pulm: no increased WOB  Abdomen: soft, gravid, nontender  Extremities: wnl     TAUS: Breech, placenta anterior  VE: Deferred    EFM: 135 bpm, mod variability, + accels, - decels; reactive and reassuring  [place]: no ctx

## 2025-03-21 NOTE — OB RN PATIENT PROFILE - NS TRANSFER PATIENT BELONGINGS
Pt's BP 85/46 and 86/56 both arms. Repeated 80/48
Cell Phone/PDA (specify)/Jewelry/Money (specify)/Clothing

## 2025-03-21 NOTE — OB RN PATIENT PROFILE - NS_PRENATALLABSOURCEHEPATITISCPN_OBGYN_ALL_OB
Nursing Note by Sandra De La Fuente RN at 09/12/18 05:59 PM     Author:  Sandra De La Fuente RN Service:  (none) Author Type:  Registered Nurse     Filed:  09/12/18 05:59 PM Encounter Date:  9/12/2018 Status:  Signed     :  Sandra De La Fuente RN (Registered Nurse)            Patient brought to exam room.  Electronically Signed by:    Sandra De La Fuente RN , 9/12/2018  Pt brought back to room, pt name and birthday verified.  Last visit with ANURADHA IGLESIAS was on 11/15/2015 at 11:45 AM in [facility]  Last visit with [facility] was on 03/21/2017 at 10:00 AM in [facility]  Match done based on reference date of today 9/12/18  Adalberto Anna was offered treatment for pain control:[DW1.1T] Yes.  Patient refused comfort measures to reduce pain.[DW1.1M]              Revision History        User Key Date/Time User Provider Type Action    > DW1.1 09/12/18 05:59 PM Sandra De La Fuente RN Registered Nurse Sign    M - Manual, T - Template            
unknown

## 2025-03-21 NOTE — OB RN PREOPERATIVE CHECKLIST - AICD PRESENT
discharge. Left eye exhibits no discharge. Neck: Normal range of motion. Neck supple. Cardiovascular: Normal rate, regular rhythm and normal heart sounds. Pulmonary/Chest: Effort normal and breath sounds normal. No respiratory distress. She has no decreased breath sounds. She has no wheezes. She has no rhonchi. She has no rales. Abdominal: Soft. Bowel sounds are normal. There is no tenderness. There is no rebound and no guarding. Lymphadenopathy:        Head (right side): Tonsillar adenopathy present. No submental, no submandibular, no preauricular, no posterior auricular and no occipital adenopathy present. Head (left side): Tonsillar adenopathy present. No submental, no submandibular, no preauricular, no posterior auricular and no occipital adenopathy present. She has cervical adenopathy. Right cervical: Superficial cervical adenopathy present. No deep cervical and no posterior cervical adenopathy present. Left cervical: Superficial cervical adenopathy present. No deep cervical and no posterior cervical adenopathy present. Neurological: She is alert and oriented to person, place, and time. Skin: Skin is warm and dry. She is not diaphoretic. Psychiatric: She has a normal mood and affect.  Her behavior is normal.     Vitals:    12/19/18 0910   BP: 108/66   Site: Left Upper Arm   Position: Sitting   Cuff Size: Medium Adult   Pulse: 99   Resp: 16   Temp: 97.7 °F (36.5 °C)   TempSrc: Oral   Weight: 200 lb (90.7 kg)   Height: 5' 5\" (1.651 m)     BP Readings from Last 3 Encounters:   12/19/18 108/66   11/27/18 110/66   10/09/18 96/62              DIAGNOSTIC FINDINGS:  CBC:No results found for: WBC, HGB, PLT    BMP:  No results found for: NA, K, CL, CO2, BUN, CREATININE, GLUCOSE      FASTING LIPID PANEL:No results found for: CHOL, HDL, TRIG    Results for orders placed or performed in visit on 12/19/18   POCT rapid strep A   Result Value Ref Range    Strep A Ag Positive (A) None
no

## 2025-03-21 NOTE — PRE-ANESTHESIA EVALUATION ADULT - NSANTHPMHFT_GEN_ALL_CORE
30yo  presenting at 37w5d for scheduled pCS d/t breech presentation. PMH s/f asthma (cold induced per pt, last sxs 2024; never hospitalized or intubated for exacerbation) and iron deficiency anemia on PO iron supp. Pregnancy c/b gHTN not on meds and gDM managed with lifestyle changes.

## 2025-03-21 NOTE — OB RN INTRAOPERATIVE NOTE - NSSELHIDDEN_OBGYN_ALL_OB_FT
[NS_DeliveryAttending1_OBGYN_ALL_OB_FT:[id number]],[NS_DeliveryAssist1_OBGYN_ALL_OB_FT:[id number]],[NS_DeliveryRN_OBGYN_ALL_OB_FT:[id number]],[NS_CirculateRN2_OBGYN_ALL_OB_FT:[id number]]

## 2025-03-21 NOTE — OB RN DELIVERY SUMMARY - NSSELHIDDEN_OBGYN_ALL_OB_FT
[NS_DeliveryAttending1_OBGYN_ALL_OB_FT:[id number]],[NS_DeliveryAssist1_OBGYN_ALL_OB_FT:[id number]],[NS_DeliveryRN_OBGYN_ALL_OB_FT:[id number]],[NS_CirculateRN2_OBGYN_ALL_OB_FT:[id number]]

## 2025-03-22 ENCOUNTER — TRANSCRIPTION ENCOUNTER (OUTPATIENT)
Age: 30
End: 2025-03-22

## 2025-03-22 LAB
BASOPHILS # BLD AUTO: 0.02 K/UL — SIGNIFICANT CHANGE UP (ref 0–0.2)
BASOPHILS NFR BLD AUTO: 0.2 % — SIGNIFICANT CHANGE UP (ref 0–2)
EOSINOPHIL # BLD AUTO: 0.02 K/UL — SIGNIFICANT CHANGE UP (ref 0–0.5)
EOSINOPHIL NFR BLD AUTO: 0.2 % — SIGNIFICANT CHANGE UP (ref 0–6)
HCT VFR BLD CALC: 32.4 % — LOW (ref 34.5–45)
HGB BLD-MCNC: 10.6 G/DL — LOW (ref 11.5–15.5)
IMM GRANULOCYTES NFR BLD AUTO: 0.4 % — SIGNIFICANT CHANGE UP (ref 0–0.9)
LYMPHOCYTES # BLD AUTO: 18.1 % — SIGNIFICANT CHANGE UP (ref 13–44)
LYMPHOCYTES # BLD AUTO: 2.03 K/UL — SIGNIFICANT CHANGE UP (ref 1–3.3)
MCHC RBC-ENTMCNC: 29.8 PG — SIGNIFICANT CHANGE UP (ref 27–34)
MCHC RBC-ENTMCNC: 32.7 G/DL — SIGNIFICANT CHANGE UP (ref 32–36)
MCV RBC AUTO: 91 FL — SIGNIFICANT CHANGE UP (ref 80–100)
MONOCYTES # BLD AUTO: 0.85 K/UL — SIGNIFICANT CHANGE UP (ref 0–0.9)
MONOCYTES NFR BLD AUTO: 7.6 % — SIGNIFICANT CHANGE UP (ref 2–14)
NEUTROPHILS # BLD AUTO: 8.22 K/UL — HIGH (ref 1.8–7.4)
NEUTROPHILS NFR BLD AUTO: 73.5 % — SIGNIFICANT CHANGE UP (ref 43–77)
NRBC BLD AUTO-RTO: 0 /100 WBCS — SIGNIFICANT CHANGE UP (ref 0–0)
PLATELET # BLD AUTO: 180 K/UL — SIGNIFICANT CHANGE UP (ref 150–400)
RBC # BLD: 3.56 M/UL — LOW (ref 3.8–5.2)
RBC # FLD: 14.6 % — HIGH (ref 10.3–14.5)
T PALLIDUM AB TITR SER: NEGATIVE — SIGNIFICANT CHANGE UP
WBC # BLD: 11.19 K/UL — HIGH (ref 3.8–10.5)
WBC # FLD AUTO: 11.19 K/UL — HIGH (ref 3.8–10.5)

## 2025-03-22 RX ORDER — ACETAMINOPHEN 500 MG/5ML
3 LIQUID (ML) ORAL
Qty: 0 | Refills: 0 | DISCHARGE
Start: 2025-03-22

## 2025-03-22 RX ORDER — TOUCHLESS CARE ZINC OXIDE PROTECTANT 20; 25 G/100G; G/100G
1 SPRAY TOPICAL ONCE
Refills: 0 | Status: COMPLETED | OUTPATIENT
Start: 2025-03-22 | End: 2025-03-22

## 2025-03-22 RX ADMIN — KETOROLAC TROMETHAMINE 30 MILLIGRAM(S): 30 INJECTION, SOLUTION INTRAMUSCULAR; INTRAVENOUS at 05:36

## 2025-03-22 RX ADMIN — KETOROLAC TROMETHAMINE 30 MILLIGRAM(S): 30 INJECTION, SOLUTION INTRAMUSCULAR; INTRAVENOUS at 18:30

## 2025-03-22 RX ADMIN — Medication 975 MILLIGRAM(S): at 09:14

## 2025-03-22 RX ADMIN — Medication 975 MILLIGRAM(S): at 15:52

## 2025-03-22 RX ADMIN — KETOROLAC TROMETHAMINE 30 MILLIGRAM(S): 30 INJECTION, SOLUTION INTRAMUSCULAR; INTRAVENOUS at 12:01

## 2025-03-22 RX ADMIN — KETOROLAC TROMETHAMINE 30 MILLIGRAM(S): 30 INJECTION, SOLUTION INTRAMUSCULAR; INTRAVENOUS at 12:16

## 2025-03-22 RX ADMIN — TOUCHLESS CARE ZINC OXIDE PROTECTANT 1 APPLICATION(S): 20; 25 SPRAY TOPICAL at 12:01

## 2025-03-22 RX ADMIN — Medication 975 MILLIGRAM(S): at 09:44

## 2025-03-22 RX ADMIN — Medication 975 MILLIGRAM(S): at 15:22

## 2025-03-22 RX ADMIN — Medication 975 MILLIGRAM(S): at 02:41

## 2025-03-22 RX ADMIN — Medication 975 MILLIGRAM(S): at 22:00

## 2025-03-22 RX ADMIN — Medication 975 MILLIGRAM(S): at 21:20

## 2025-03-22 RX ADMIN — ENOXAPARIN SODIUM 40 MILLIGRAM(S): 100 INJECTION SUBCUTANEOUS at 05:37

## 2025-03-22 RX ADMIN — KETOROLAC TROMETHAMINE 30 MILLIGRAM(S): 30 INJECTION, SOLUTION INTRAMUSCULAR; INTRAVENOUS at 19:00

## 2025-03-22 NOTE — DISCHARGE NOTE OB - PLAN OF CARE
Take Motrin 600mg every 6 hours and/or tylenol 650mg every 6 hours as needed for pain. You can alternate between motrin and tylenol every 3 hours for better pain control throughout the day. Call your Doctor  to schedule a follow up appointment for 2 weeks. Call your Doctor if you experience severe abdominal pain not improved by oral pain medications, heavy bright red vaginal bleeding saturating more than 1 pad per hour, or fever greater than 100.4F. Please follow up with your OB in 1 week for a blood pressure check. Please purchase an electronic blood pressure cuff at the pharmacy. Check blood pressures at home 3x a day and keep a log to review with your OB/GYN. If your systolic blood pressure (top number) is greater than 140 or your diastolic blood pressure (bottom number) is greater than 90, call your OB/GYN office. If your systolic blood pressure (top number) is greater than 160 or your diastolic blood pressure (bottom number) is greater than 110, please return to the hospital or go to your nearest emergency room right away. If you develop a headache not relieved by tylenol, visual disturbances, or upper abdominal pain, then please return to the hospital or go to your nearest emergency room right away. Take Motrin 600mg every 6 hours and/or tylenol 650mg every 6 hours as needed for pain. You can alternate between motrin and tylenol every 3 hours for better pain control throughout the day. Call your Doctor  to schedule a follow up appointment for 1 week. Call your Doctor if you experience severe abdominal pain not improved by oral pain medications, heavy bright red vaginal bleeding saturating more than 1 pad per hour, or fever greater than 100.4F.

## 2025-03-22 NOTE — DISCHARGE NOTE OB - HOSPITAL COURSE
Pt is a 29yF s/p c-sx.  Please see delivery note for details.  Postpartum course complicated by gestational hypertension, vitals well controlled. On day of discharge patient was ambulating, pt had adequate oral intake, and was voiding freely.  Discharge instructions and precautions were given.  Will return to clinic in 1-2 weeks for incision check.

## 2025-03-22 NOTE — DISCHARGE NOTE OB - PATIENT PORTAL LINK FT
You can access the FollowMyHealth Patient Portal offered by [facility] by registering at the following website: [email or web address]. By joining [facility]’s FollowMyHealth portal, you will also be able to view your health information using other applications (apps) compatible with our system.

## 2025-03-22 NOTE — DISCHARGE NOTE OB - NS MD DC FALL RISK RISK
For information on Fall & Injury Prevention, visit: [email or web address] OR  [email or web address] OR  https://www.cdc.gov/steadi/patient.html

## 2025-03-22 NOTE — DISCHARGE NOTE OB - CARE PROVIDER_API CALL
Massimo Gutierrez  Obstetrics and Gynecology  [address] [place] [id number]  Phone: [phone]  Fax: [phone]  Follow Up Time:    Massimo Gutierrez  Obstetrics and Gynecology  [address] [place] [id number]  Phone: [phone]  Fax: [phone]  Follow Up Time: 1 week

## 2025-03-22 NOTE — DISCHARGE NOTE OB - FINANCIAL ASSISTANCE
[facility] provides services at a reduced cost to those who are determined to be eligible through [facility]’s financial assistance program. Information regarding [facility]’s financial assistance program can be found by going to [email or web address] or by calling [phone].

## 2025-03-22 NOTE — DISCHARGE NOTE OB - MEDICATION SUMMARY - MEDICATIONS TO TAKE
I will START or STAY ON the medications listed below when I get home from the hospital:    ibuprofen 600 mg oral tablet  -- 1 tab(s) by mouth every 6 hours  -- Indication: For pain    acetaminophen 325 mg oral tablet  -- 3 tab(s) by mouth every 6 hours  -- Indication: For pain    PNV Prenatal oral tablet  -- 1 tab(s) by mouth once a day  -- Indication: For postpartum    ferrous sulfate 325 mg (65 mg elemental iron) oral tablet  -- 1 tab(s) by mouth once a day  -- Indication: For postpartum

## 2025-03-22 NOTE — LACTATION INITIAL EVALUATION - LACTATION INTERVENTIONS
discussed feeding considerations for early term gestation. mother aware to HE and offer expressed colostrum via syringe or finger after feeds until baby is latching more consistently. baby observed with deep latch bilaterally in football hold, but is sleepy and needs frequent stimulation. colostrum is easily expressible./initiate/review safe skin-to-skin/initiate/review hand expression/initiate/review techniques for position and latch/initiate/review alternate feeding method/reviewed components of an effective feeding and at least 8 effective feedings per day required/reviewed importance of monitoring infant diapers, the breastfeeding log, and minimum output each day/reviewed benefits and recommendations for rooming in/reviewed feeding on demand/by cue at least 8 times a day/recommended follow-up with pediatrician within 24 hours of discharge/reviewed indications of inadequate milk transfer that would require supplementation

## 2025-03-23 RX ORDER — IBUPROFEN 200 MG
600 TABLET ORAL EVERY 6 HOURS
Refills: 0 | Status: DISCONTINUED | OUTPATIENT
Start: 2025-03-23 | End: 2025-03-24

## 2025-03-23 RX ADMIN — KETOROLAC TROMETHAMINE 30 MILLIGRAM(S): 30 INJECTION, SOLUTION INTRAMUSCULAR; INTRAVENOUS at 06:14

## 2025-03-23 RX ADMIN — KETOROLAC TROMETHAMINE 30 MILLIGRAM(S): 30 INJECTION, SOLUTION INTRAMUSCULAR; INTRAVENOUS at 06:54

## 2025-03-23 RX ADMIN — ENOXAPARIN SODIUM 40 MILLIGRAM(S): 100 INJECTION SUBCUTANEOUS at 06:25

## 2025-03-23 RX ADMIN — Medication 975 MILLIGRAM(S): at 09:47

## 2025-03-23 RX ADMIN — Medication 975 MILLIGRAM(S): at 10:17

## 2025-03-23 RX ADMIN — Medication 975 MILLIGRAM(S): at 16:47

## 2025-03-23 RX ADMIN — KETOROLAC TROMETHAMINE 30 MILLIGRAM(S): 30 INJECTION, SOLUTION INTRAMUSCULAR; INTRAVENOUS at 12:54

## 2025-03-23 RX ADMIN — Medication 975 MILLIGRAM(S): at 21:09

## 2025-03-23 RX ADMIN — KETOROLAC TROMETHAMINE 30 MILLIGRAM(S): 30 INJECTION, SOLUTION INTRAMUSCULAR; INTRAVENOUS at 12:34

## 2025-03-23 RX ADMIN — Medication 600 MILLIGRAM(S): at 19:26

## 2025-03-23 RX ADMIN — KETOROLAC TROMETHAMINE 30 MILLIGRAM(S): 30 INJECTION, SOLUTION INTRAMUSCULAR; INTRAVENOUS at 00:50

## 2025-03-23 RX ADMIN — Medication 600 MILLIGRAM(S): at 23:45

## 2025-03-23 RX ADMIN — KETOROLAC TROMETHAMINE 30 MILLIGRAM(S): 30 INJECTION, SOLUTION INTRAMUSCULAR; INTRAVENOUS at 01:15

## 2025-03-23 RX ADMIN — Medication 975 MILLIGRAM(S): at 04:01

## 2025-03-23 RX ADMIN — Medication 975 MILLIGRAM(S): at 05:47

## 2025-03-23 RX ADMIN — Medication 600 MILLIGRAM(S): at 19:56

## 2025-03-23 RX ADMIN — Medication 975 MILLIGRAM(S): at 16:27

## 2025-03-24 VITALS
TEMPERATURE: 98 F | OXYGEN SATURATION: 96 % | SYSTOLIC BLOOD PRESSURE: 138 MMHG | HEART RATE: 76 BPM | DIASTOLIC BLOOD PRESSURE: 87 MMHG | RESPIRATION RATE: 17 BRPM

## 2025-03-24 PROCEDURE — 84156 ASSAY OF PROTEIN URINE: CPT

## 2025-03-24 PROCEDURE — 59050 FETAL MONITOR W/REPORT: CPT

## 2025-03-24 PROCEDURE — 83615 LACTATE (LD) (LDH) ENZYME: CPT

## 2025-03-24 PROCEDURE — 86780 TREPONEMA PALLIDUM: CPT

## 2025-03-24 PROCEDURE — 85384 FIBRINOGEN ACTIVITY: CPT

## 2025-03-24 PROCEDURE — 82962 GLUCOSE BLOOD TEST: CPT

## 2025-03-24 PROCEDURE — 86850 RBC ANTIBODY SCREEN: CPT

## 2025-03-24 PROCEDURE — 86901 BLOOD TYPING SEROLOGIC RH(D): CPT

## 2025-03-24 PROCEDURE — 80053 COMPREHEN METABOLIC PANEL: CPT

## 2025-03-24 PROCEDURE — 82570 ASSAY OF URINE CREATININE: CPT

## 2025-03-24 PROCEDURE — 86900 BLOOD TYPING SEROLOGIC ABO: CPT

## 2025-03-24 PROCEDURE — 85025 COMPLETE CBC W/AUTO DIFF WBC: CPT

## 2025-03-24 PROCEDURE — 85610 PROTHROMBIN TIME: CPT

## 2025-03-24 PROCEDURE — 85730 THROMBOPLASTIN TIME PARTIAL: CPT

## 2025-03-24 PROCEDURE — 84550 ASSAY OF BLOOD/URIC ACID: CPT

## 2025-03-24 PROCEDURE — 36415 COLL VENOUS BLD VENIPUNCTURE: CPT

## 2025-03-24 RX ADMIN — Medication 975 MILLIGRAM(S): at 03:04

## 2025-03-24 RX ADMIN — Medication 600 MILLIGRAM(S): at 05:37

## 2025-03-24 RX ADMIN — Medication 975 MILLIGRAM(S): at 09:07

## 2025-03-24 RX ADMIN — MAGNESIUM HYDROXIDE 30 MILLILITER(S): 400 SUSPENSION ORAL at 09:07

## 2025-03-24 RX ADMIN — ENOXAPARIN SODIUM 40 MILLIGRAM(S): 100 INJECTION SUBCUTANEOUS at 05:37

## 2025-03-24 RX ADMIN — Medication 600 MILLIGRAM(S): at 11:46

## 2025-03-24 NOTE — PROGRESS NOTE ADULT - ASSESSMENT
A/P: 29y s/p  section, POD#3, stable  -  Pain: PO motrin q6hrs, tylenol q8hrs, oxycodone for severe pain PRN  -  Post-operatively labs: hemodynamically stable, no symptoms of anemia   -  GI: tolerating regular diet, passing gas  -  : s/p rodriguez , urinating without difficulty  -  DVT prophylaxis: encouraged increased ambulation, SCDs, SQL  -  Dispo: POD 3 or 4

## 2025-03-24 NOTE — PROGRESS NOTE ADULT - SUBJECTIVE AND OBJECTIVE BOX
Patient evaluated at bedside this morning, resting comfortable in bed.   She reports pain is well controlled with oral pain medications.   She denies chest pain, shortness of breath, vomiting or heavy vaginal bleeding.  She has been ambulating without assistance, voiding spontaneously, passing gas, tolerating regular diet.     Physical Exam:  Vital Signs Last 24 Hrs  T(C): 36.6 (23 Mar 2025 02:00), Max: 36.9 (22 Mar 2025 21:26)  T(F): 97.8 (23 Mar 2025 02:00), Max: 98.5 (22 Mar 2025 21:26)  HR: 67 (23 Mar 2025 02:00) (62 - 85)  BP: 116/73 (23 Mar 2025 02:00) (116/73 - 145/93)  BP(mean): --  RR: 18 (23 Mar 2025 02:00) (18 - 18)  SpO2: 98% (23 Mar 2025 02:00) (98% - 98%)    Parameters below as of 22 Mar 2025 21:26  Patient On (Oxygen Delivery Method): room air    GA: NAD, A+0 x 3  Pulm: no increased WOB  Abd: soft, nontender, mildly distended, no rebound or guarding, incision clean, dry and intact, uterus firm at midline, 2 fb below umbilicus  Extremities: mild lower extremity edema, no calf tenderness                             10.6   11.19 )-----------( 180      ( 22 Mar 2025 05:30 )             32.4     03-21    139  |  105  |  4[L]  ----------------------------<  69[L]  3.9   |  21[L]  |  0.43[L]    Ca    9.0      21 Mar 2025 13:41    TPro  6.6  /  Alb  3.7  /  TBili  0.4  /  DBili  x   /  AST  17  /  ALT  11  /  AlkPhos  110  03-21      PT/INR - ( 21 Mar 2025 13:41 )   PT: 11.1 sec;   INR: 0.96          PTT - ( 21 Mar 2025 13:41 )  PTT:26.7 sec    A/P:  s/p  section, POD#2, stable  -  Pain: PO motrin q6hrs, tylenol q8hrs, oxycodone for severe pain PRN  -  Post-operatively labs: hemodynamically stable, no symptoms of anemia   -  GI: tolerating regular diet, passing gas  -  : s/p rodriguez , urinating without difficulty  -  DVT prophylaxis: encouraged increased ambulation, SCDs, SQL  -  Dispo: POD 3 or 4
Patient evaluated at bedside this morning, resting comfortable in bed.   She reports pain is well controlled with oral pain medications.   She denies headache, dizziness, chest pain, shortness of breath, vomiting or heavy vaginal bleeding.  She has been ambulating without assistance, voiding spontaneously, passing gas, tolerating regular diet.     Physical Exam:  Vital Signs Last 24 Hrs  T(C): 36.8 (24 Mar 2025 06:00), Max: 37.2 (24 Mar 2025 02:05)  T(F): 98.2 (24 Mar 2025 06:00), Max: 98.9 (24 Mar 2025 02:05)  HR: 70 (24 Mar 2025 06:00) (68 - 74)  BP: 127/82 (24 Mar 2025 06:00) (117/74 - 137/88)  BP(mean): --  RR: 18 (24 Mar 2025 06:00) (18 - 18)  SpO2: 99% (24 Mar 2025 06:00) (98% - 100%)    Parameters below as of 23 Mar 2025 14:01  Patient On (Oxygen Delivery Method): room air        GA: NAD, A+0 x 3  Pulm: no increased WOB  Abd: soft, nontender, mildly distended, no rebound or guarding, incision clean, dry and intact, uterus firm at midline, 2 fb below umbilicus  Extremities: mild lower extremity edema, no calf tenderness

## 2025-03-25 ENCOUNTER — APPOINTMENT (OUTPATIENT)
Dept: ANTEPARTUM | Facility: CLINIC | Age: 30
End: 2025-03-25

## 2025-03-28 DIAGNOSIS — Z3A.37 37 WEEKS GESTATION OF PREGNANCY: ICD-10-CM

## 2025-03-28 DIAGNOSIS — J45.909 UNSPECIFIED ASTHMA, UNCOMPLICATED: ICD-10-CM

## 2025-03-28 DIAGNOSIS — O26.893 OTHER SPECIFIED PREGNANCY RELATED CONDITIONS, THIRD TRIMESTER: ICD-10-CM

## 2025-03-28 DIAGNOSIS — D50.9 IRON DEFICIENCY ANEMIA, UNSPECIFIED: ICD-10-CM

## 2025-04-03 ENCOUNTER — NON-APPOINTMENT (OUTPATIENT)
Age: 30
End: 2025-04-03

## 2025-04-16 ENCOUNTER — NON-APPOINTMENT (OUTPATIENT)
Age: 30
End: 2025-04-16

## 2025-05-05 ENCOUNTER — NON-APPOINTMENT (OUTPATIENT)
Age: 30
End: 2025-05-05

## 2025-05-19 ENCOUNTER — NON-APPOINTMENT (OUTPATIENT)
Age: 30
End: 2025-05-19